# Patient Record
Sex: MALE | Race: WHITE | Employment: OTHER | URBAN - METROPOLITAN AREA
[De-identification: names, ages, dates, MRNs, and addresses within clinical notes are randomized per-mention and may not be internally consistent; named-entity substitution may affect disease eponyms.]

---

## 2017-02-01 DIAGNOSIS — I10 ESSENTIAL HYPERTENSION: Primary | ICD-10-CM

## 2017-02-03 RX ORDER — INDAPAMIDE 2.5 MG/1
TABLET, FILM COATED ORAL
Qty: 30 TAB | Refills: 5 | Status: SHIPPED | OUTPATIENT
Start: 2017-02-03 | End: 2022-06-07

## 2017-02-03 RX ORDER — METOPROLOL SUCCINATE 100 MG/1
TABLET, EXTENDED RELEASE ORAL
Qty: 30 TAB | Refills: 5 | Status: SHIPPED | OUTPATIENT
Start: 2017-02-03 | End: 2017-10-28 | Stop reason: SDUPTHER

## 2017-02-03 NOTE — TELEPHONE ENCOUNTER
Requested Prescriptions     Signed Prescriptions Disp Refills    metoprolol succinate (TOPROL-XL) 100 mg tablet 30 Tab 5     Sig: TAKE 1 TABLET BY MOUTH EVERY DAY     Authorizing Provider: Bridger Fountain     Ordering User: Karime Holley    indapamide (LOZOL) 2.5 mg tablet 30 Tab 5     Sig: TAKE 1 TABLET BY MOUTH EVERY DAY     Authorizing Provider: Bridger Fountain     Ordering User: Karime Holley     Refill approved per verbal order from Dr. Karime Gillette

## 2017-10-28 DIAGNOSIS — I10 ESSENTIAL HYPERTENSION: ICD-10-CM

## 2017-10-30 RX ORDER — METOPROLOL SUCCINATE 100 MG/1
TABLET, EXTENDED RELEASE ORAL
Qty: 30 TAB | Refills: 5 | Status: SHIPPED | OUTPATIENT
Start: 2017-10-30 | End: 2022-06-07

## 2018-03-21 ENCOUNTER — HOSPITAL ENCOUNTER (OUTPATIENT)
Dept: CT IMAGING | Age: 68
Discharge: HOME | End: 2018-03-21
Attending: INTERNAL MEDICINE
Payer: MEDICARE

## 2018-03-21 DIAGNOSIS — I71.20 ANEURYSM, AORTA, THORACIC: ICD-10-CM

## 2018-03-21 PROCEDURE — 71275 CT ANGIOGRAPHY CHEST: CPT

## 2018-03-21 PROCEDURE — 74011636320 HC RX REV CODE- 636/320: Performed by: RADIOLOGY

## 2018-03-21 RX ADMIN — IOPAMIDOL 95 ML: 755 INJECTION, SOLUTION INTRAVENOUS at 07:45

## 2019-08-27 ENCOUNTER — HOSPITAL ENCOUNTER (OUTPATIENT)
Dept: PREADMISSION TESTING | Age: 69
Discharge: HOME | End: 2019-08-27
Payer: MEDICARE

## 2019-08-27 VITALS
BODY MASS INDEX: 28.8 KG/M2 | HEIGHT: 70 IN | OXYGEN SATURATION: 97 % | DIASTOLIC BLOOD PRESSURE: 68 MMHG | SYSTOLIC BLOOD PRESSURE: 132 MMHG | HEART RATE: 72 BPM | TEMPERATURE: 98.2 F | RESPIRATION RATE: 14 BRPM | WEIGHT: 201.2 LBS

## 2019-08-27 LAB
ANION GAP SERPL CALC-SCNC: 4 MMOL/L (ref 5–15)
BUN SERPL-MCNC: 15 MG/DL (ref 6–20)
BUN/CREAT SERPL: 15 (ref 12–20)
CALCIUM SERPL-MCNC: 9.5 MG/DL (ref 8.5–10.1)
CHLORIDE SERPL-SCNC: 103 MMOL/L (ref 97–108)
CO2 SERPL-SCNC: 32 MMOL/L (ref 21–32)
CREAT SERPL-MCNC: 1.02 MG/DL (ref 0.7–1.3)
GLUCOSE SERPL-MCNC: 105 MG/DL (ref 65–100)
POTASSIUM SERPL-SCNC: 3.5 MMOL/L (ref 3.5–5.1)
SODIUM SERPL-SCNC: 139 MMOL/L (ref 136–145)

## 2019-08-27 PROCEDURE — 80048 BASIC METABOLIC PNL TOTAL CA: CPT

## 2019-08-27 PROCEDURE — 36415 COLL VENOUS BLD VENIPUNCTURE: CPT

## 2019-08-27 RX ORDER — SILDENAFIL 100 MG/1
100 TABLET, FILM COATED ORAL AS NEEDED
COMMUNITY

## 2019-08-27 NOTE — PERIOP NOTES
[address] [address] [facility]   MAIN OR                                  [phone]   MAIN PRE OP                          [phone]                                                                                AMBULATORY PRE OP          [phone]  PRE-ADMISSION TESTING    [phone]   Surgery Date:   Friday 8/30/19       Is surgery arrival time given by surgeon? NO  If NO, [facility] staff will call you between 3 and 7pm the day before your surgery with your arrival time. (If your surgery is on a Monday, we will call you the Friday before.)    Call [phone] after 7pm Monday-Friday if you did not receive your arrival time. INSTRUCTIONS BEFORE YOUR SURGERY   When You  Arrive Arrive at the [address] [facility] on the day of your surgery  Have your insurance card, photo ID, and any copayment (if needed)   Food   and   Drink NO food or drink after midnight the night before surgery    This means NO water, gum, mints, coffee, juice, etc.  No alcohol (beer, wine, liquor) 24 hours before and after surgery   Medications to   TAKE   Morning of Surgery MEDICATIONS TO TAKE THE MORNING OF SURGERY WITH A SIP OF WATER:    Metoprolol, Preservision   Medications  To  STOP      7 days before surgery  Non-Steroidal anti-inflammatory Drugs (NSAID's): for example, Ibuprofen (Advil, Motrin), Naproxen (Aleve)   Aspirin, if taking for pain    Herbal supplements, vitamins, and fish oil   Other:  (Pain medications not listed above, including Tylenol may be taken)   Blood  Thinners  If you take  Aspirin, Plavix, Coumadin, or any blood-thinning or anti-blood clot medicine, talk to the doctor who prescribed the medications for pre-operative instructions.    Bathing Clothing  Jewelry  Valuables       If you shower the morning of surgery, please do not apply anything to your skin (lotions, powders, deodorant, or makeup, especially francisca)   Follow all special bath instructions (for total joint replacement, spine and bowel surgeries)   Do not shave or trim anywhere 24 hours before surgery   Wear your hair loose or down; no pony-tails, buns, or metal hair clips   Wear loose, comfortable, clean clothes   Wear glasses instead of contacts   Leave money, valuables, and jewelry, including body piercings, at home   Going Home - or Spending the Night  SAME-DAY SURGERY: You must have a responsible adult drive you home and stay with you 24 hours after surgery   ADMITS: If your doctor is keeping you in the hospital after surgery, leave personal belongings/luggage in your car until you have a hospital room number. Hospital discharge time is 12 noon  Drivers must be here before 12 noon unless you are told differently   Special Instructions FRee  parking available from [address]     Follow all instructions so your surgery wont be cancelled. Please, be on time. If a situation occurs and you are delayed the day of surgery, call [phone]     If your physical condition changes (like a fever, cold, flu, etc.) call your surgeon. The patient was contacted  in person. Home medication reviewed and verified during PAT appointment. The patient verbalizes understanding of all instructions and does not  need reinforcement.

## 2019-08-29 ENCOUNTER — ANESTHESIA EVENT (OUTPATIENT)
Dept: SURGERY | Age: 69
End: 2019-08-29
Payer: MEDICARE

## 2019-08-30 ENCOUNTER — HOSPITAL ENCOUNTER (OUTPATIENT)
Age: 69
Setting detail: OUTPATIENT SURGERY
Discharge: HOME | End: 2019-08-30
Attending: SURGERY | Admitting: SURGERY
Payer: MEDICARE

## 2019-08-30 ENCOUNTER — ANESTHESIA (OUTPATIENT)
Dept: SURGERY | Age: 69
End: 2019-08-30
Payer: MEDICARE

## 2019-08-30 VITALS
OXYGEN SATURATION: 100 % | TEMPERATURE: 97.7 F | HEART RATE: 63 BPM | RESPIRATION RATE: 14 BRPM | SYSTOLIC BLOOD PRESSURE: 115 MMHG | DIASTOLIC BLOOD PRESSURE: 68 MMHG

## 2019-08-30 DIAGNOSIS — K42.9 UMBILICAL HERNIA WITHOUT OBSTRUCTION AND WITHOUT GANGRENE: Primary | ICD-10-CM

## 2019-08-30 PROCEDURE — 74011250636 HC RX REV CODE- 250/636: Performed by: NURSE ANESTHETIST, CERTIFIED REGISTERED

## 2019-08-30 PROCEDURE — C1781 MESH (IMPLANTABLE): HCPCS | Performed by: SURGERY

## 2019-08-30 PROCEDURE — 77030020782 HC GWN BAIR PAWS FLX 3M -B

## 2019-08-30 PROCEDURE — 76210000026 HC REC RM PH II 1 TO 1.5 HR: Performed by: SURGERY

## 2019-08-30 PROCEDURE — 76060000033 HC ANESTHESIA 1 TO 1.5 HR: Performed by: SURGERY

## 2019-08-30 PROCEDURE — 74011250636 HC RX REV CODE- 250/636: Performed by: ANESTHESIOLOGY

## 2019-08-30 PROCEDURE — 77030031139 HC SUT VCRL2 J&J -A: Performed by: SURGERY

## 2019-08-30 PROCEDURE — 77030039266 HC ADH SKN EXOFIN S2SG -A: Performed by: SURGERY

## 2019-08-30 PROCEDURE — 77030002966 HC SUT PDS J&J -A: Performed by: SURGERY

## 2019-08-30 PROCEDURE — 74011250636 HC RX REV CODE- 250/636: Performed by: SURGERY

## 2019-08-30 PROCEDURE — 74011250637 HC RX REV CODE- 250/637: Performed by: SURGERY

## 2019-08-30 PROCEDURE — 77030018836 HC SOL IRR NACL ICUM -A: Performed by: SURGERY

## 2019-08-30 PROCEDURE — 76010000149 HC OR TIME 1 TO 1.5 HR: Performed by: SURGERY

## 2019-08-30 PROCEDURE — 74011250636 HC RX REV CODE- 250/636

## 2019-08-30 PROCEDURE — 88302 TISSUE EXAM BY PATHOLOGIST: CPT

## 2019-08-30 PROCEDURE — 77030002933 HC SUT MCRYL J&J -A: Performed by: SURGERY

## 2019-08-30 PROCEDURE — 77030040361 HC SLV COMPR DVT MDII -B

## 2019-08-30 PROCEDURE — 74011000250 HC RX REV CODE- 250: Performed by: SURGERY

## 2019-08-30 PROCEDURE — 77030011640 HC PAD GRND REM COVD -A: Performed by: SURGERY

## 2019-08-30 DEVICE — VENTRALEX ST HERNIA PATCH, 6.4 CM (2.5"), CIRCLE
Type: IMPLANTABLE DEVICE | Site: ABDOMEN | Status: FUNCTIONAL
Brand: VENTRALEX

## 2019-08-30 RX ORDER — DIPHENHYDRAMINE HYDROCHLORIDE 50 MG/ML
12.5 INJECTION, SOLUTION INTRAMUSCULAR; INTRAVENOUS AS NEEDED
Status: DISCONTINUED | OUTPATIENT
Start: 2019-08-30 | End: 2019-08-30 | Stop reason: HOSPADM

## 2019-08-30 RX ORDER — MIDAZOLAM HYDROCHLORIDE 1 MG/ML
INJECTION, SOLUTION INTRAMUSCULAR; INTRAVENOUS AS NEEDED
Status: DISCONTINUED | OUTPATIENT
Start: 2019-08-30 | End: 2019-08-30 | Stop reason: HOSPADM

## 2019-08-30 RX ORDER — ALBUTEROL SULFATE 0.83 MG/ML
2.5 SOLUTION RESPIRATORY (INHALATION) AS NEEDED
Status: DISCONTINUED | OUTPATIENT
Start: 2019-08-30 | End: 2019-08-30 | Stop reason: HOSPADM

## 2019-08-30 RX ORDER — KETOROLAC TROMETHAMINE 30 MG/ML
INJECTION, SOLUTION INTRAMUSCULAR; INTRAVENOUS AS NEEDED
Status: DISCONTINUED | OUTPATIENT
Start: 2019-08-30 | End: 2019-08-30 | Stop reason: HOSPADM

## 2019-08-30 RX ORDER — HYDROMORPHONE HYDROCHLORIDE 1 MG/ML
0.5 INJECTION, SOLUTION INTRAMUSCULAR; INTRAVENOUS; SUBCUTANEOUS
Status: DISCONTINUED | OUTPATIENT
Start: 2019-08-30 | End: 2019-08-30 | Stop reason: HOSPADM

## 2019-08-30 RX ORDER — SODIUM CHLORIDE, SODIUM LACTATE, POTASSIUM CHLORIDE, CALCIUM CHLORIDE 600; 310; 30; 20 MG/100ML; MG/100ML; MG/100ML; MG/100ML
125 INJECTION, SOLUTION INTRAVENOUS CONTINUOUS
Status: DISCONTINUED | OUTPATIENT
Start: 2019-08-30 | End: 2019-08-30 | Stop reason: HOSPADM

## 2019-08-30 RX ORDER — ONDANSETRON 2 MG/ML
4 INJECTION INTRAMUSCULAR; INTRAVENOUS AS NEEDED
Status: DISCONTINUED | OUTPATIENT
Start: 2019-08-30 | End: 2019-08-30 | Stop reason: HOSPADM

## 2019-08-30 RX ORDER — PROPOFOL 10 MG/ML
INJECTION, EMULSION INTRAVENOUS
Status: DISCONTINUED | OUTPATIENT
Start: 2019-08-30 | End: 2019-08-30 | Stop reason: HOSPADM

## 2019-08-30 RX ORDER — LIDOCAINE HYDROCHLORIDE 10 MG/ML
0.1 INJECTION, SOLUTION EPIDURAL; INFILTRATION; INTRACAUDAL; PERINEURAL AS NEEDED
Status: DISCONTINUED | OUTPATIENT
Start: 2019-08-30 | End: 2019-08-30 | Stop reason: HOSPADM

## 2019-08-30 RX ORDER — SODIUM CHLORIDE, SODIUM LACTATE, POTASSIUM CHLORIDE, CALCIUM CHLORIDE 600; 310; 30; 20 MG/100ML; MG/100ML; MG/100ML; MG/100ML
150 INJECTION, SOLUTION INTRAVENOUS CONTINUOUS
Status: DISCONTINUED | OUTPATIENT
Start: 2019-08-30 | End: 2019-08-30 | Stop reason: HOSPADM

## 2019-08-30 RX ORDER — DEXAMETHASONE SODIUM PHOSPHATE 4 MG/ML
INJECTION, SOLUTION INTRA-ARTICULAR; INTRALESIONAL; INTRAMUSCULAR; INTRAVENOUS; SOFT TISSUE AS NEEDED
Status: DISCONTINUED | OUTPATIENT
Start: 2019-08-30 | End: 2019-08-30 | Stop reason: HOSPADM

## 2019-08-30 RX ORDER — PROPOFOL 10 MG/ML
INJECTION, EMULSION INTRAVENOUS AS NEEDED
Status: DISCONTINUED | OUTPATIENT
Start: 2019-08-30 | End: 2019-08-30 | Stop reason: HOSPADM

## 2019-08-30 RX ORDER — FENTANYL CITRATE 50 UG/ML
INJECTION, SOLUTION INTRAMUSCULAR; INTRAVENOUS AS NEEDED
Status: DISCONTINUED | OUTPATIENT
Start: 2019-08-30 | End: 2019-08-30 | Stop reason: HOSPADM

## 2019-08-30 RX ORDER — CEFAZOLIN SODIUM/WATER 2 G/20 ML
2 SYRINGE (ML) INTRAVENOUS ONCE
Status: COMPLETED | OUTPATIENT
Start: 2019-08-30 | End: 2019-08-30

## 2019-08-30 RX ORDER — HYDROCODONE BITARTRATE AND ACETAMINOPHEN 5; 325 MG/1; MG/1
1 TABLET ORAL ONCE
Status: COMPLETED | OUTPATIENT
Start: 2019-08-30 | End: 2019-08-30

## 2019-08-30 RX ORDER — HYDROCODONE BITARTRATE AND ACETAMINOPHEN 5; 325 MG/1; MG/1
1 TABLET ORAL
Qty: 10 TAB | Refills: 0 | Status: SHIPPED | OUTPATIENT
Start: 2019-08-30 | End: 2019-09-02

## 2019-08-30 RX ADMIN — SODIUM CHLORIDE, SODIUM LACTATE, POTASSIUM CHLORIDE, AND CALCIUM CHLORIDE: 600; 310; 30; 20 INJECTION, SOLUTION INTRAVENOUS at 09:58

## 2019-08-30 RX ADMIN — Medication 2 G: at 09:07

## 2019-08-30 RX ADMIN — SODIUM CHLORIDE, SODIUM LACTATE, POTASSIUM CHLORIDE, AND CALCIUM CHLORIDE 150 ML/HR: 600; 310; 30; 20 INJECTION, SOLUTION INTRAVENOUS at 08:29

## 2019-08-30 RX ADMIN — HYDROCODONE BITARTRATE AND ACETAMINOPHEN 1 TABLET: 5; 325 TABLET ORAL at 10:59

## 2019-08-30 RX ADMIN — FENTANYL CITRATE 50 MCG: 50 INJECTION, SOLUTION INTRAMUSCULAR; INTRAVENOUS at 08:47

## 2019-08-30 RX ADMIN — PROPOFOL 30 MG: 10 INJECTION, EMULSION INTRAVENOUS at 08:52

## 2019-08-30 RX ADMIN — KETOROLAC TROMETHAMINE 30 MG: 30 INJECTION INTRAMUSCULAR; INTRAVENOUS at 09:21

## 2019-08-30 RX ADMIN — DEXAMETHASONE SODIUM PHOSPHATE 8 MG: 4 INJECTION, SOLUTION INTRAMUSCULAR; INTRAVENOUS at 08:59

## 2019-08-30 RX ADMIN — PROPOFOL 100 MCG/KG/MIN: 10 INJECTION, EMULSION INTRAVENOUS at 08:55

## 2019-08-30 RX ADMIN — MIDAZOLAM HYDROCHLORIDE 1 MG: 1 INJECTION, SOLUTION INTRAMUSCULAR; INTRAVENOUS at 08:47

## 2019-08-30 NOTE — OP NOTES
OPERATIVE REPORT       BRIEF OPERATIVE NOTE    Date of Procedure: 2019   Preoperative Diagnosis: UMBILICAL HERNIA  Postoperative Diagnosis: UMBILICAL HERNIA    Procedure(s):  OPEN UMBILICAL HERNIA REPAIR  Surgeon(s) and Role:     * Tristan Carnes MD - Primary         Surgical Assistant:     Surgical Staff:  Circ-1: Huong Rios RN  Scrub Tech-1: Isabel Urena  Surg Asst-1: Laly Soto I  Event Time In Time Out   Incision Start 0912    Incision Close       Anesthesia: MAC   Estimated Blood Loss: minimal  Specimens:   ID Type Source Tests Collected by Time Destination   1 : umbilical hernia sac Preservative Perla Domínguez MD 2019 [id number] Pathology      Findings: umbilical fascial defect measuring 1.5 cm containing pre peritoneal fat and omentum - no adherent bowel. Complications: none   Implants:   Implant Name Type Inv. Item Serial No.  Lot No. LRB No. Used Action   MESH VENTRALEX ST MED --  - SNA  MESH VENTRALEX ST MED --  NA BARD DAVOL [id number] N/A 1 Implanted               DESCRIPTION OF PROCEDURE:     Patient was evaluated in the preop holding area and updates to history and physical was completed. The site of surgery was confirmed with the patient and the site was marked with indelible ink. Patient was then brought to the operating room and the abdomen and groins including the genitals were prepped in the standard manner. A time out was completed confirming patient, , procedure, site of surgery, special instruments and equipment needed for the procedure. Under intravenous sedation and the patient supine     anesthesia was infiltrated using lidocaine. A subumbilical incision was   made and deepened through subcutaneous tissue. The sac was clearly   identified and  from surrounding structures including the   overlying skin. The sac was opened at the level of the fascia, and the   excess sac was excised.  Adhesions on the undersurface of the peritoneum   were freed, and repair was performed using a Bard Ventralex 6.4 -cm mesh. The tags were sutured to the fascia on either side with PDS, and the fascia   was approximated in a craniocaudad direction with PDS. The umbilical dimple   was refashioned with 2-0 Vicryl, and the skin was closed with subcutaneous   3-0 Monocryl and subcuticular 4-0 Monocryl with Dermabond. ESTIMATED BLOOD LOSS: Minimal.     SPECIMENS REMOVED: Sac. COUNTS: Correct at the completion of surgery.      Fer Marquez MD   cc: Fer Marquez MD

## 2019-08-30 NOTE — BRIEF OP NOTE
BRIEF OPERATIVE NOTE    Date of Procedure: 8/30/2019   Preoperative Diagnosis: UMBILICAL HERNIA  Postoperative Diagnosis: UMBILICAL HERNIA    Procedure(s):  OPEN UMBILICAL HERNIA REPAIR  Surgeon(s) and Role:     * Latasha Francis MD - Primary         Surgical Assistant:     Surgical Staff:  Circ-1: uLcina Ann RN  Scrub Tech-1: Oscar Regan  Surg Asst-1: Cynthia Alejandra I  Event Time In Time Out   Incision Start 0912    Incision Close       Anesthesia: MAC   Estimated Blood Loss: minimal  Specimens:   ID Type Source Tests Collected by Time Destination   1 : umbilical hernia sac Preservative Donna Godinez MD 8/30/2019 2607 Pathology      Findings: umbilical fascial defect measuring 1.5 cm containing pre peritoneal fat and omentum - no adherent bowel. Complications: none   Implants:   Implant Name Type Inv.  Item Serial No.  Lot No. LRB No. Used Action   MESH VENTRALEX ST MED --  - SNA  MESH VENTRALEX ST MED --  NA BARD DAVOL [id number] N/A 1 Implanted

## 2019-08-30 NOTE — DISCHARGE INSTRUCTIONS
P O S T-OP E R AT I V E I N S T RU C T I O N S  OU T PAT I E N T S U RG E R Y HE R N I A RE PA I R    Today you underwent a hernia repair which is usually well tolerated. However, below is a list of instructions which are important for you to follow. If you have questions, please feel free to call your surgeons office. 1 EATING: Please eat lightly when you go home today for the first 24 hours. You may resume your regular diet after that.    2 EXERCISE: Limit your exercise for the first week, although stairs or other walking is fine. 3. DRESSING: Please keep the dressing dry for 48 hours after your surgery. After this time, the bandage may be removed and left undressed or covered with a lighter dressing. You may shower or bathe in 1 days. If you have steri-strips leave them intact for one week. After that you may gently remove them or allow them to fall off on their own. If you have stitches, they will be removed in the office after 1-2 weeks. 4. NO LIFTING: Until you have seen your surgeon in his office following surgery, at which time you will receive further instructions. 5. DRIVING: No driving for 1 weeks but you may ride as a passenger anytime. 6. PAIN: A prescription for pain has been given to you or your family. Please use it as prescribed and if this is inadequate, please call your surgeons office. In some cases, Tylenol or Advil may be adequate; and in that case, you will not need to fill the prescription. Pain medication may cause constipation - Colace or Milk of Magnesia may be used as needed. 7 WOUND: If you notice any increased redness, swelling, pain, or fever, please call the office. If this is noticed at a time after normal office hours, please call our answering service at [phone]. The  will then contact your surgeon or the SURGICAL ASSOCIATE on call.     8. URINATION: If unable to void (unable to pass your water) please return to the hospital emergency department. 9. DISCOLORATION: Do not be alarmed by black or bluish discoloration of your anatomy. This may extent to the scrotum or labia. This will be due to blood under the skin that will absorb itself and resolve over several days with no ill-effects. 10 APPOINTMENT: Your surgeon would like to see you in his/her in 7 days. If this appointment has not been made for you prior to your departure from Outpatient Surgery, please call your surgeons office to schedule your appointment. [facility] IN GENERAL, VASCULAR, ONCOLOGIC, BREAST AND COLON & RECTAL SURGERY  [email or web address]    DISCHARGE SUMMARY from your Nurse    The following personal items collected during your admission are returned to you:   Dental Appliance: Dental Appliances: None  Vision:    Hearing Aid:    Jewelry: Jewelry: None  Clothing: Clothing: Shirt, Undergarments, Pants, Footwear  Other Valuables:    Valuables sent to safe:      PATIENT INSTRUCTIONS:    After general anesthesia or intravenous sedation, for 24 hours or while taking prescription Narcotics:  · Limit your activities  · Do not drive and operate hazardous machinery  · Do not make important personal or business decisions  · Do  not drink alcoholic beverages  · If you have not urinated within 8 hours after discharge, please contact your surgeon on call. Report the following to your surgeon:  · Excessive pain, swelling, redness or odor of or around the surgical area  · Temperature over 100.5  · Nausea and vomiting lasting longer than 4 hours or if unable to take medications  · Any signs of decreased circulation or nerve impairment to extremity: change in color, persistent  numbness, tingling, coldness or increase pain  · Any questions    COUGH AND DEEP BREATHE    Breathing deep and coughing are very important exercises to do after surgery. Deep breathing and coughing open the little air tubes and air sacks in your lungs. You take deep breaths every day.   You may not even notice - it is just something you do when you sigh or yawn. It is a natural exercise you do to keep these air passages open. After surgery, take deep breaths and cough, on purpose. Coughing and deep breathing help prevent bronchitis and pneumonia after surgery. If you had chest or belly surgery, use a pillow as a \"hug lily\" and hold it tightly to your chest or belly when you cough. DIRECTIONS:  6. Take 10 to 15 slow deep breaths every hour while awake. 7. Breathe in deeply, and hold it for 2 seconds. 8. Exhale slowly through puckered lips, like blowing up a balloon. 9. After every 4th or 5th deep breath, hug your pillow to your chest or belly and give a hard, deep cough. Yes, it will probably hurt. But doing this exercise is very important part of healing after surgery. Take your pain medicine to help you do this exercise without too much pain. IF YOU HAVE BEEN DIAGNOSED WITH SLEEP APNEA, PLEASE USE YOUR SLEEP APNEA DEVICE OR CPAP MACHINE WHEN YOU INTEND TO NAP AFTER TAKING PAIN MEDICATION. Ankle Pumps    Ankle pumps increase the circulation of oxygenated blood to your lower extremities and decrease your risk for circulation problems such as blood clots. They also stretch the muscles, tendons and ligaments in your foot and ankle, and prevent joint contracture in the ankle and foot, especially after surgeries on the legs. It is important to do ankle pump exercises regularly after surgery because immobility increases your risk for developing a blood clot. Your doctor may also have you take an Aspirin for the next few days as well. If your doctor did not ask you to take an Aspirin, consult with him before starting Aspirin therapy on your own. Slowly point your foot forward, feeling the muscles on the top of your lower leg stretch, and hold this position for 5 seconds.                   Next, pull your foot back toward you as far as possible, stretching the calf muscles, and hold that position for 5 seconds. Repeat with the other foot. Perform 10 repetitions every hour while awake for both ankles if possible (down and then up with the foot once is one repetition). You should feel gentle stretching of the muscles in your lower leg when doing this exercise. If you feel pain, or your range of motion is limited, don't  Push too hard. Only go the limit your joint and muscles will let you go. If you have increasing pain, progressively worsening leg warmth or swelling, STOP the exercise and call your doctor. Below is information about the medications your doctor is prescribing after your visit:    Other information in your discharge envelope:  []     PRESCRIPTIONS  []     PHYSICAL THERAPY PRESCRIPTION  []     APPOINTMENT CARDS  []     Regional Anesthesia Pamphlet for block or block with On-Q Catheter from Anesthesia Service  []     Medical device information sheets/pamphlets from their    []     School/work excuse note. []     /parent work excuse note. These are general instructions for a healthy lifestyle:    *  Please give a list of your current medications to your Primary Care Provider. *  Please update this list whenever your medications are discontinued, doses are      changed, or new medications (including over-the-counter products) are added. *  Please carry medication information at all times in case of emergency situations. About Smoking  No smoking / No tobacco products / Avoid exposure to second hand smoke    Surgeon General's Warning:  Quitting smoking now greatly reduces serious risk to your health. Obesity, smoking, and sedentary lifestyle greatly increases your risk for illness and disease. A healthy diet, regular physical exercise & weight monitoring are important for maintaining a healthy lifestyle.     Congestive Heart Failure  You may be retaining fluid if you have a history of heart failure or if you experience any of the following symptoms:  Weight gain of 3 pounds or more overnight or 5 pounds in a week, increased swelling in our hands or feet or shortness of breath while lying flat in bed. Please call your doctor as soon as you notice any of these symptoms; do not wait until your next office visit. Recognize signs and symptoms of STROKE:  F - face looks uneven  A - arms unable to move or move even  S - speech slurred or non-existent  T - time-call 911 as soon as signs and symptoms begin-DO NOT go         Back to bed or wait to see if you get better-TIME IS BRAIN. Warning signs of HEART ATTACK  Call 911 if you have these symptoms    · Chest discomfort. Most heart attacks involve discomfort in the center of the chest that lasts more than a few minutes, or that goes away and comes back. It can feel like uncomfortable pressure, squeezing, fullness, or pain. · Discomfort in other areas of the upper body. Symptoms can include pain or discomfort in one or both        Arms, the back, neck, jaw, or stomach. ·  Shortness of breath with or without chest discomfort. · Other signs may include breaking out in a cold sweat, nausea, or lightheadedness    Don't wait more than five minutes to call 911 - MINUTES MATTER! Fast action can save your life. Calling 911 is almost always the fastest way to get lifesaving treatment. Emergency Medical Services staff can begin treatment when they arrive - up to an hour sooner than if someone gets to the hospital by car. LAYO [facility] MEDICATION AND SIDE EFFECT GUIDE    The [facility] MEDICATION AND SIDE EFFECT GUIDE was provided to the PATIENT AND CARE PROVIDER.   Information provided includes instruction about drug purpose and common side effects for the following medications:    · [address]

## 2019-11-26 ENCOUNTER — HOSPITAL ENCOUNTER (OUTPATIENT)
Dept: NUCLEAR MEDICINE | Age: 69
Discharge: HOME | End: 2019-11-26
Attending: UROLOGY
Payer: MEDICARE

## 2019-11-26 DIAGNOSIS — N20.0 KIDNEY STONE: ICD-10-CM

## 2019-11-26 DIAGNOSIS — N13.30 HYDRONEPHROSIS: ICD-10-CM

## 2019-11-26 PROCEDURE — 78708 K FLOW/FUNCT IMAGE W/DRUG: CPT

## 2019-11-26 PROCEDURE — 74011250636 HC RX REV CODE- 250/636: Performed by: UROLOGY

## 2019-11-26 RX ORDER — FUROSEMIDE 10 MG/ML
20 INJECTION INTRAMUSCULAR; INTRAVENOUS ONCE
Status: COMPLETED | OUTPATIENT
Start: 2019-11-26 | End: 2019-11-26

## 2019-11-26 RX ADMIN — FUROSEMIDE 20 MG: 10 INJECTION, SOLUTION INTRAMUSCULAR; INTRAVENOUS at 14:24

## 2020-10-05 ENCOUNTER — TRANSCRIBE ORDER (OUTPATIENT)
Dept: SCHEDULING | Age: 70
End: 2020-10-05

## 2020-10-05 DIAGNOSIS — R93.1 ABNORMAL ECHOCARDIOGRAM: Primary | ICD-10-CM

## 2020-10-05 DIAGNOSIS — I77.810 DILATATION OF THORACIC AORTA (HCC): ICD-10-CM

## 2020-10-13 ENCOUNTER — HOSPITAL ENCOUNTER (OUTPATIENT)
Dept: CT IMAGING | Age: 70
Discharge: HOME | End: 2020-10-13
Attending: FAMILY MEDICINE
Payer: SELF-PAY

## 2020-10-13 DIAGNOSIS — I77.810 DILATATION OF THORACIC AORTA (HCC): ICD-10-CM

## 2020-10-13 DIAGNOSIS — R93.1 ABNORMAL ECHOCARDIOGRAM: ICD-10-CM

## 2020-10-13 PROCEDURE — 75571 CT HRT W/O DYE W/CA TEST: CPT

## 2021-05-25 ENCOUNTER — TRANSCRIBE ORDER (OUTPATIENT)
Dept: SCHEDULING | Age: 71
End: 2021-05-25

## 2021-05-25 DIAGNOSIS — I71.20 ANEURYSM, AORTA, THORACIC: Primary | ICD-10-CM

## 2021-06-07 ENCOUNTER — HOSPITAL ENCOUNTER (OUTPATIENT)
Dept: LAB | Age: 71
Discharge: HOME | End: 2021-06-07

## 2021-09-13 ENCOUNTER — HOSPITAL ENCOUNTER (OUTPATIENT)
Dept: CT IMAGING | Age: 71
Discharge: HOME | End: 2021-09-13
Attending: INTERNAL MEDICINE
Payer: MEDICARE

## 2021-09-13 DIAGNOSIS — I71.20 ANEURYSM, AORTA, THORACIC: ICD-10-CM

## 2021-09-13 LAB — CREAT BLD-MCNC: 1.1 MG/DL (ref 0.6–1.3)

## 2021-09-13 PROCEDURE — 71275 CT ANGIOGRAPHY CHEST: CPT

## 2021-09-13 PROCEDURE — 74011000636 HC RX REV CODE- 636: Performed by: STUDENT IN AN ORGANIZED HEALTH CARE EDUCATION/TRAINING PROGRAM

## 2021-09-13 PROCEDURE — 82565 ASSAY OF CREATININE: CPT

## 2021-09-13 RX ADMIN — IOPAMIDOL 100 ML: 755 INJECTION, SOLUTION INTRAVENOUS at 07:40

## 2021-09-27 ENCOUNTER — TRANSCRIBE ORDER (OUTPATIENT)
Dept: SCHEDULING | Age: 71
End: 2021-09-27

## 2021-09-27 DIAGNOSIS — R18.8 FLUID FILLED ABDOMEN: Primary | ICD-10-CM

## 2021-09-29 ENCOUNTER — HOSPITAL ENCOUNTER (OUTPATIENT)
Dept: ULTRASOUND IMAGING | Age: 71
Discharge: HOME | End: 2021-09-29
Attending: INTERNAL MEDICINE
Payer: MEDICARE

## 2021-09-29 DIAGNOSIS — R18.8 FLUID FILLED ABDOMEN: ICD-10-CM

## 2021-09-29 PROCEDURE — 76700 US EXAM ABDOM COMPLETE: CPT

## 2021-12-02 ENCOUNTER — APPOINTMENT (RX ONLY)
Dept: URBAN - METROPOLITAN AREA CLINIC 141 | Facility: CLINIC | Age: 71
Setting detail: DERMATOLOGY
End: 2021-12-02

## 2021-12-02 DIAGNOSIS — D22 MELANOCYTIC NEVI: ICD-10-CM

## 2021-12-02 PROBLEM — D22.5 MELANOCYTIC NEVI OF TRUNK: Status: ACTIVE | Noted: 2021-12-02

## 2021-12-02 PROCEDURE — ? COUNSELING

## 2021-12-02 PROCEDURE — ? TREATMENT REGIMEN

## 2021-12-02 PROCEDURE — ? MONITORING

## 2021-12-02 PROCEDURE — ? DEFER

## 2021-12-02 PROCEDURE — 99203 OFFICE O/P NEW LOW 30 MIN: CPT

## 2021-12-02 PROCEDURE — ? ADDITIONAL NOTES

## 2021-12-02 ASSESSMENT — LOCATION SIMPLE DESCRIPTION DERM
LOCATION SIMPLE: RIGHT LOWER BACK
LOCATION SIMPLE: RIGHT UPPER BACK
LOCATION SIMPLE: LEFT BUTTOCK

## 2021-12-02 ASSESSMENT — LOCATION ZONE DERM: LOCATION ZONE: TRUNK

## 2021-12-02 ASSESSMENT — LOCATION DETAILED DESCRIPTION DERM
LOCATION DETAILED: RIGHT INFERIOR MEDIAL MIDBACK
LOCATION DETAILED: RIGHT INFERIOR LATERAL UPPER BACK
LOCATION DETAILED: LEFT BUTTOCK

## 2021-12-02 NOTE — PROCEDURE: DEFER
Detail Level: Detailed
Introduction Text (Please End With A Colon): The following procedure was deferred.
Procedure To Be Performed At Next Visit: Excision

## 2022-06-06 NOTE — PROGRESS NOTES
CARDIOLOGY OFFICE NOTE    Huang Haines MD, [address], [place], [address] [facility]  Phone [phone]; Fax [phone]  Mobile [phone]   Voice Mail [phone]    Primary care: Ainsley Guerra MD       ATTENTION:   This medical record was transcribed using an electronic medical records/speech recognition system. Although proofread, it may and can contain electronic, spelling and other errors. Corrections may be executed at a later time. Please feel free to contact us for any clarifications as needed. ICD-10-CM ICD-9-CM   1. Essential hypertension  I10 401.9   2. Thoracic aortic aneurysm without rupture (HCC)  I71.2 441.2   3. LVH (left ventricular hypertrophy)  I51.7 429.3   4. Statin intolerance  Z78.9 995.27   5. Agatston CAC score, <100  R93.1 793.2            Rayray Eduardo is a 66 y.o. male with  referred for thoracic aortic aneurysm          Cardiac risk factors: dyslipidemia, male gender, hypertension  I have personally obtained the history from the patient. HISTORY OF PRESENTING ILLNESS    He had seen Alejandra Farr in the past: He is doing well with no interval issues. He has a history of statin intolerance with myalgias and arthralgias. .  He states he has been doing well. He has a cardiologist in [place] as well and they recently did an echocardiogram to look at his aortic root. That was done in January and is scheduled for these test every 6 months. Occasionally I think we should move to a MRI to reduce his radiation risk. Talk to him today about his cholesterol being elevated and he has tried statins and has had myalgias but has not had any demonstration of myopathy based on elevated CPK. I suggested that he do calcium scoring and if it is elevated then possibly place him on a PCSK9 inhibitor and he is in agreement to this plan.          ACTIVE PROBLEM LIST     Patient Active Problem List    Diagnosis Date Noted    Statin intolerance 2016    Essential hypertension 2015    Agatston CAC score, <100 2015    Thoracic aortic aneurysm ([facility] [address] 2015    LVH (left ventricular hypertrophy) 2015    Aortic regurgitation 2015           PAST MEDICAL HISTORY     Past Medical History:   Diagnosis Date    Agatston CAC score, <100 2015    Aortic aneurysm (HCC)     thoracic    Aortic regurgitation     Complicated headache syndromes     Head CT scan negative    Dyslipidemia     Hearing loss     HTN (hypertension)     on medication, usually good control on medication    Kidney stones     Nasal polyps     Thoracic aortic aneurysm ([facility] [address] 2015    Urethral stricture     Left, small           PAST SURGICAL HISTORY     Past Surgical History:   Procedure Laterality Date    HX CYST REMOVAL Left Back    HX OTHER SURGICAL      L cornea repair          ALLERGIES     Allergies   Allergen Reactions    Niacin Rash    Potassium Other (comments)     Heartburn, indigestion    Statins-Hmg-Coa Reductase Inhibitors Myalgia     Muscle pain, and pt says caused \"UTI\"          FAMILY HISTORY     Family History   Problem Relation Age of Onset    Hypertension Mother     Heart Disease Father     negative for cardiac disease       SOCIAL HISTORY     Social History     Socioeconomic History    Marital status:    Tobacco Use    Smoking status: Former Smoker     Packs/day: 0.50     Years: 2.00     Pack years: 1.00     Types: Cigarettes     Quit date: 1965     Years since quittin.3    Smokeless tobacco: Never Used   Substance and Sexual Activity    Alcohol use: Yes     Alcohol/week: 7.0 standard drinks     Types: 7 Cans of beer per week     Comment: ocassionally has wine or liquor, <1X/month    Drug use: No         MEDICATIONS     Current Outpatient Medications   Medication Sig    metoprolol succinate 25 mg CSpX Take 25 Tablets by mouth daily.     spironolactone (ALDACTONE) 25 mg tablet Take 25 mg by mouth daily.  latanoprost, PF, 0.005 % drop Administer 0.005 Drops to both eyes daily.  cyanocobalamin, vitamin B-12, 5,000 mcg subl Take 5,000 mcg by mouth daily.  pyridoxine, vitamin B6, (VITAMIN B-6) 100 mg tablet Take 100 mg by mouth BID SAT&SUN.  cholecalciferol (VITAMIN D3) (5000 Units/125 mcg) tab tablet Take 125 Tablets by mouth BID Mon Wed & Fri.    sildenafil citrate (VIAGRA) 100 mg tablet Take 100 mg by mouth as needed.  vitamins  A,C,E-zinc-copper (Ocuvite PreserVision) 7,160 unit- 113 mg-100 unit tab tablet Take 2 Tablets by mouth as directed.  antiox #8/om3/dha/epa/lut/zeax (PRESERVISION AREDS 2, OMEGA-3, PO) Take 2 Caps by mouth two (2) times daily (with meals). (Patient not taking: Reported on 6/7/2022)    metoprolol succinate (TOPROL-XL) 100 mg tablet TAKE 1 TABLET BY MOUTH EVERY DAY (Patient not taking: Reported on 6/7/2022)    indapamide (LOZOL) 2.5 mg tablet TAKE 1 TABLET BY MOUTH EVERY DAY (Patient not taking: Reported on 6/7/2022)     No current facility-administered medications for this visit. I have reviewed the nurses notes, vitals, problem list, allergy list, medical history, family, social history and medications. REVIEW OF SYMPTOMS    Positives per HPI  General: Pt denies excessive weight gain or loss. Pt is able to conduct ADL's  HEENT: Denies blurred vision, headaches, hearing loss, epistaxis and difficulty swallowing. Respiratory: Denies cough, congestion, shortness of breath, RIVERA, wheezing or stridor.   Cardiovascular: Denies precordial pain, palpitations, edema or PND  Gastrointestinal: Denies poor appetite, indigestion, abdominal pain or blood in stool  Genitourinary: Denies hematuria, dysuria, increased urinary frequency  Musculoskeletal: Denies joint pain or swelling from muscles or joints  Neurologic: Denies tremor, paresthesias, headache, or sensory motor disturbance  Psychiatric: Denies confusion, insomnia, depression  Integumentray: Denies rash, itching or ulcers. Hematologic: Denies easy bruising, bleeding     PHYSICAL EXAMINATION      Vitals:    22 1053   BP: 128/78   Pulse: 88   SpO2: 98%   Weight: 185 lb (83.9 kg)   Height: 5' 10\" (1.778 m)     General: Well developed, in no acute distress. HEENT: No jaundice, oral mucosa moist, no oral ulcers  Neck: Supple, no stiffness, no lymphadenopathy, supple  Heart:  Normal S1/S2 negative S3 or S4. Regular, no murmur, gallop or rub, no jugular venous distention  Respiratory: Clear bilaterally x 4, no wheezing or rales  Abdomen:   Soft, non-tender, bowel sounds are active. Extremities:  No edema, normal cap refill, no cyanosis. Musculoskeletal: No clubbing, no deformities  Neuro: A&Ox3, speech clear, gait stable, cooperative, no focal neurologic deficits  Skin: Skin color is normal. No rashes or lesions. Non diaphoretic, moist.  Vascular: 2+ pulses symmetric in all extremities        EKG: Normal sinus rhythm with a PVC     DIAGNOSTIC DATA     1. CT heart scan  10/24/14 - 24    10/13/20-Ca scoring:   Left main: 23  Left anterior descendin  Left circumflex: 0  Right coronary: 0  Posterior descendin  OM1 23  1st Diag. 0  PLB 0  Total calcium score: 164    2. Echo   10/30/15 - mild LVH, EF 50%, LAE, moderate AR, mild MR, dilated aortic root (52mm), compared with previous echo 14 - aortic root increased from 50mm to 52mm  16 - LVEF 60%, ascending aorta 53mm, normal arch and descending aorta, moderate AR  16-LVEF 60% ascending aorta 53mm moderate AR  22- EF 55-60%, mild MR/TR, mod/severe AI, severe aortic root dilatation, bicuspid AV cannot be excluded    3. CT Chest   11/3/15 - 4.8 x 4.8 cm ascending aortic aneurysm without evidence of dissection. The descending aorta is normal in caliber  CTA of chest 16-4.8x4.8cm ascending aortic aneurysm  CTA of chest -21-1. Ascending thoracic aortic aneurysm measures 4.9 cm in diameter, grossly 4.8 cm.  Descending thoracic aorta unremarkable. 2. Large fluid collection in the mid abdomen is only partially visualized, could  reflect severely distended bladder versus other indeterminant fluid collection. Any clinical suspicion for bladder outlet obstruction? Consider dedicated  abdominal ultrasound or CT for more definitive evaluation. LABORATORY DATA          No results found for: WBC, HGBPOC, HGB, HGBP, HCTPOC, HCT, PHCT, RBCH, PLT, MCV, HGBEXT, HCTEXT, PLTEXT, HGBEXT, HCTEXT, PLTEXT   Lab Results   Component Value Date/Time    Sodium 139 08/27/2019 10:01 AM    Potassium 3.5 08/27/2019 10:01 AM    Chloride 103 08/27/2019 10:01 AM    CO2 32 08/27/2019 10:01 AM    Anion gap 4 (L) 08/27/2019 10:01 AM    Glucose 105 (H) 08/27/2019 10:01 AM    BUN 15 08/27/2019 10:01 AM    Creatinine 1.02 08/27/2019 10:01 AM    BUN/Creatinine ratio 15 08/27/2019 10:01 AM    GFR est AA >60 08/27/2019 10:01 AM    GFR est non-AA >60 08/27/2019 10:01 AM    Calcium 9.5 08/27/2019 10:01 AM                     ASSESSMENT/RECOMMENDATIONS:.      1.  Thoracic aortic aneurysm  -he did have a CT scan in 5/2021 :1. Ascending thoracic aortic aneurysm measures 4.9 cm in diameter, grossly 4.8  cm. Descending thoracic aorta unremarkable.  -We will order repeat echo and will do every 6 months and occasionally a MRI  2) moderate aortic insufficiency question bicuspid aortic valve  2. Dyslipidemia  -We will obtain calcium scoring  -If calcium is elevated will attempt again on a PCSK9 inhibitor  3. Hypertension  -Blood pressure is at goal on current medical regimen  4. We will repeat calcium scoring and if significantly elevated then would go forward with a PCSK9 inhibitor    Follow up with me in 6-8 weeks      Orders Placed This Encounter    AMB POC EKG ROUTINE W/ 12 LEADS, INTER & REP     Order Specific Question:   Reason for Exam:     Answer:   htn    metoprolol succinate 25 mg CSpX     Sig: Take 25 Tablets by mouth daily.     spironolactone (ALDACTONE) 25 mg tablet     Sig: Take 25 mg by mouth daily.  latanoprost, PF, 0.005 % drop     Sig: Administer 0.005 Drops to both eyes daily.  cyanocobalamin, vitamin B-12, 5,000 mcg subl     Sig: Take 5,000 mcg by mouth daily.  pyridoxine, vitamin B6, (VITAMIN B-6) 100 mg tablet     Sig: Take 100 mg by mouth BID SAT&SUN.  cholecalciferol (VITAMIN D3) (5000 Units/125 mcg) tab tablet     Sig: Take 125 Tablets by mouth BID Mon Wed & Fri.    vitamins  A,C,E-zinc-copper (Ocuvite PreserVision) 7,160 unit- 113 mg-100 unit tab tablet     Sig: Take 2 Tablets by mouth as directed. We discussed the expected course, resolution and complications of the diagnosis(es) in detail. Medication risks, benefits, costs, interactions, and alternatives were discussed as indicated. I advised him to contact the office if his condition worsens, changes or fails to improve as anticipated. He expressed understanding with the diagnosis(es) and plan          Follow-up and Dispositions  ·   Return in about 7 weeks (around 7/26/2022). I have discussed the diagnosis with  Rikki Cerrato and the intended plan as seen in the above orders. Questions were answered concerning future plans. I have discussed medication side effects and warnings with the patient as well. Thank you,  Buster Ken MD for involving me in the care of  Rikki Cerrato. Please do not hesitate to contact me for further questions/concerns. Huang Smith MD, [address] [place] [address] Rd[address]      [facility], [address] [place] [address] [facility]      [phone] / [phone] Fax

## 2022-06-06 NOTE — PATIENT INSTRUCTIONS

## 2022-06-07 ENCOUNTER — OFFICE VISIT (OUTPATIENT)
Dept: CARDIOLOGY CLINIC | Age: 72
End: 2022-06-07
Payer: MEDICARE

## 2022-06-07 VITALS
DIASTOLIC BLOOD PRESSURE: 78 MMHG | SYSTOLIC BLOOD PRESSURE: 128 MMHG | BODY MASS INDEX: 26.48 KG/M2 | HEIGHT: 70 IN | HEART RATE: 88 BPM | OXYGEN SATURATION: 98 % | WEIGHT: 185 LBS

## 2022-06-07 DIAGNOSIS — Z78.9 STATIN INTOLERANCE: ICD-10-CM

## 2022-06-07 DIAGNOSIS — I51.7 LVH (LEFT VENTRICULAR HYPERTROPHY): ICD-10-CM

## 2022-06-07 DIAGNOSIS — I10 ESSENTIAL HYPERTENSION: Primary | ICD-10-CM

## 2022-06-07 DIAGNOSIS — R93.1 AGATSTON CAC SCORE, <100: ICD-10-CM

## 2022-06-07 DIAGNOSIS — I71.20 THORACIC AORTIC ANEURYSM WITHOUT RUPTURE: ICD-10-CM

## 2022-06-07 PROCEDURE — 1123F ACP DISCUSS/DSCN MKR DOCD: CPT | Performed by: SPECIALIST

## 2022-06-07 PROCEDURE — 93005 ELECTROCARDIOGRAM TRACING: CPT | Performed by: SPECIALIST

## 2022-06-07 PROCEDURE — 93010 ELECTROCARDIOGRAM REPORT: CPT | Performed by: SPECIALIST

## 2022-06-07 PROCEDURE — 1101F PT FALLS ASSESS-DOCD LE1/YR: CPT | Performed by: SPECIALIST

## 2022-06-07 PROCEDURE — 99204 OFFICE O/P NEW MOD 45 MIN: CPT | Performed by: SPECIALIST

## 2022-06-07 PROCEDURE — G8417 CALC BMI ABV UP PARAM F/U: HCPCS | Performed by: SPECIALIST

## 2022-06-07 PROCEDURE — G8752 SYS BP LESS 140: HCPCS | Performed by: SPECIALIST

## 2022-06-07 PROCEDURE — G8754 DIAS BP LESS 90: HCPCS | Performed by: SPECIALIST

## 2022-06-07 PROCEDURE — G0463 HOSPITAL OUTPT CLINIC VISIT: HCPCS | Performed by: SPECIALIST

## 2022-06-07 PROCEDURE — G8510 SCR DEP NEG, NO PLAN REQD: HCPCS | Performed by: SPECIALIST

## 2022-06-07 PROCEDURE — G8427 DOCREV CUR MEDS BY ELIG CLIN: HCPCS | Performed by: SPECIALIST

## 2022-06-07 PROCEDURE — 3017F COLORECTAL CA SCREEN DOC REV: CPT | Performed by: SPECIALIST

## 2022-06-07 PROCEDURE — G8536 NO DOC ELDER MAL SCRN: HCPCS | Performed by: SPECIALIST

## 2022-06-07 RX ORDER — SPIRONOLACTONE 25 MG/1
25 TABLET ORAL DAILY
COMMUNITY
Start: 2022-04-03

## 2022-06-07 RX ORDER — CHOLECALCIFEROL TAB 125 MCG (5000 UNIT) 125 MCG
125 TAB ORAL
COMMUNITY

## 2022-06-07 RX ORDER — LATANOPROST/PF 0.005 %
0.01 DROPS OPHTHALMIC (EYE) DAILY
COMMUNITY

## 2022-06-07 RX ORDER — ACETAMINOPHEN, DIPHENHYDRAMINE HCL, PHENYLEPHRINE HCL 325; 25; 5 MG/1; MG/1; MG/1
5000 TABLET ORAL DAILY
COMMUNITY

## 2022-06-07 RX ORDER — VIT A/VIT C/VIT E/ZINC/COPPER 2148-113
2 TABLET ORAL AS DIRECTED
COMMUNITY

## 2022-06-07 RX ORDER — PYRIDOXINE HCL (VITAMIN B6) 100 MG
100 TABLET ORAL
COMMUNITY

## 2022-06-07 NOTE — PROGRESS NOTES
Chief Complaint   Patient presents with    New Patient     Self referred, LVH, aORTIC REGURGITATION     Hypertension       Vitals:    06/07/22 1053   BP: 128/78   Pulse: 88   Height: 5' 10\" (1.778 m)   Weight: 185 lb (83.9 kg)   SpO2: 98%         Chest pain: no    SOB: no    Palpitations: no    Dizziness: No more off balance     Swelling: no    Refills: no      1. Have you been to the ER, urgent care clinic since your last visit? Hospitalized since your last visit? no    2. Have you sen or consulted other health care providers outside of the [facility] system since your last visit?  (Include any pap smears or colon screening.)     Pt would like to get Echo done due to Recommendation of last

## 2022-06-07 NOTE — LETTER
6/7/2022    Patient: Zina Watkins   YOB: 1950   Date of Visit: 6/7/2022     Ronny Hernandez MD  [address] [facility]  [address] [place] [address]  Via Fax: [phone]    Dear Ronny Hernandez MD,      Thank you for referring Mr. Hazel Acharya to [facility] for evaluation. My notes for this consultation are attached. If you have questions, please do not hesitate to call me. I look forward to following your patient along with you.       Sincerely,    Austin Youngblood MD

## 2022-07-08 ENCOUNTER — ANCILLARY PROCEDURE (OUTPATIENT)
Dept: CARDIOLOGY CLINIC | Age: 72
End: 2022-07-08
Payer: MEDICARE

## 2022-07-08 VITALS
DIASTOLIC BLOOD PRESSURE: 70 MMHG | BODY MASS INDEX: 26.48 KG/M2 | SYSTOLIC BLOOD PRESSURE: 120 MMHG | HEIGHT: 70 IN | WEIGHT: 185 LBS

## 2022-07-08 DIAGNOSIS — I71.20 THORACIC AORTIC ANEURYSM WITHOUT RUPTURE: ICD-10-CM

## 2022-07-08 DIAGNOSIS — I51.7 LVH (LEFT VENTRICULAR HYPERTROPHY): ICD-10-CM

## 2022-07-08 DIAGNOSIS — R93.1 AGATSTON CAC SCORE, <100: ICD-10-CM

## 2022-07-08 DIAGNOSIS — I10 ESSENTIAL HYPERTENSION: ICD-10-CM

## 2022-07-08 DIAGNOSIS — Z78.9 STATIN INTOLERANCE: ICD-10-CM

## 2022-07-08 PROCEDURE — 93306 TTE W/DOPPLER COMPLETE: CPT | Performed by: SPECIALIST

## 2022-07-10 LAB
ECHO AO ASC DIAM: 5.1 CM
ECHO AO ASCENDING AORTA INDEX: 2.52 CM/M2
ECHO AO ROOT DIAM: 5 CM
ECHO AO ROOT INDEX: 2.48 CM/M2
ECHO AR MAX VEL PISA: 4.3 M/S
ECHO AV MEAN GRADIENT: 2 MMHG
ECHO AV MEAN VELOCITY: 0.7 M/S
ECHO AV PEAK GRADIENT: 5 MMHG
ECHO AV PEAK VELOCITY: 1.1 M/S
ECHO AV REGURGITANT PHT: 283.3 MILLISECOND
ECHO AV VELOCITY RATIO: 0.73
ECHO AV VTI: 19.4 CM
ECHO EST RA PRESSURE: 3 MMHG
ECHO LA DIAMETER INDEX: 1.83 CM/M2
ECHO LA DIAMETER: 3.7 CM
ECHO LA TO AORTIC ROOT RATIO: 0.74
ECHO LA VOL 2C: 76 ML (ref 18–58)
ECHO LA VOL 4C: 47 ML (ref 18–58)
ECHO LA VOL BP: 67 ML (ref 18–58)
ECHO LA VOL/BSA BIPLANE: 33 ML/M2 (ref 16–34)
ECHO LA VOLUME AREA LENGTH: 71 ML
ECHO LA VOLUME INDEX A2C: 38 ML/M2 (ref 16–34)
ECHO LA VOLUME INDEX A4C: 23 ML/M2 (ref 16–34)
ECHO LA VOLUME INDEX AREA LENGTH: 35 ML/M2 (ref 16–34)
ECHO LV E' LATERAL VELOCITY: 10 CM/S
ECHO LV EDV A2C: 135 ML
ECHO LV EDV A4C: 154 ML
ECHO LV EDV BP: 144 ML (ref 67–155)
ECHO LV EDV INDEX A4C: 76 ML/M2
ECHO LV EDV INDEX BP: 71 ML/M2
ECHO LV EDV NDEX A2C: 67 ML/M2
ECHO LV EJECTION FRACTION A2C: 59 %
ECHO LV EJECTION FRACTION A4C: 54 %
ECHO LV EJECTION FRACTION BIPLANE: 56 % (ref 55–100)
ECHO LV ESV A2C: 55 ML
ECHO LV ESV A4C: 71 ML
ECHO LV ESV BP: 63 ML (ref 22–58)
ECHO LV ESV INDEX A2C: 27 ML/M2
ECHO LV ESV INDEX A4C: 35 ML/M2
ECHO LV ESV INDEX BP: 31 ML/M2
ECHO LV FRACTIONAL SHORTENING: 30 % (ref 28–44)
ECHO LV INTERNAL DIMENSION DIASTOLE INDEX: 2.18 CM/M2
ECHO LV INTERNAL DIMENSION DIASTOLIC: 4.4 CM (ref 4.2–5.9)
ECHO LV INTERNAL DIMENSION SYSTOLIC INDEX: 1.53 CM/M2
ECHO LV INTERNAL DIMENSION SYSTOLIC: 3.1 CM
ECHO LV IVSD: 1.2 CM (ref 0.6–1)
ECHO LV MASS 2D: 203 G (ref 88–224)
ECHO LV MASS INDEX 2D: 100.5 G/M2 (ref 49–115)
ECHO LV POSTERIOR WALL DIASTOLIC: 1.3 CM (ref 0.6–1)
ECHO LV RELATIVE WALL THICKNESS RATIO: 0.59
ECHO LVOT AV VTI INDEX: 0.94
ECHO LVOT MEAN GRADIENT: 1 MMHG
ECHO LVOT PEAK GRADIENT: 3 MMHG
ECHO LVOT PEAK VELOCITY: 0.8 M/S
ECHO LVOT VTI: 18.2 CM
ECHO MV A VELOCITY: 0.65 M/S
ECHO MV AREA PHT: 2.9 CM2
ECHO MV E DECELERATION TIME (DT): 260.3 MS
ECHO MV E VELOCITY: 0.33 M/S
ECHO MV E/A RATIO: 0.51
ECHO MV E/E' LATERAL: 3.3
ECHO MV PRESSURE HALF TIME (PHT): 75.5 MS
ECHO RIGHT VENTRICULAR SYSTOLIC PRESSURE (RVSP): 22 MMHG
ECHO RV FREE WALL PEAK S': 10 CM/S
ECHO RV INTERNAL DIMENSION: 3.1 CM
ECHO RV TAPSE: 1.6 CM (ref 1.7–?)
ECHO TV REGURGITANT MAX VELOCITY: 2.16 M/S
ECHO TV REGURGITANT PEAK GRADIENT: 19 MMHG

## 2022-07-10 PROCEDURE — 93306 TTE W/DOPPLER COMPLETE: CPT | Performed by: SPECIALIST

## 2022-07-10 NOTE — PROGRESS NOTES
Your echocardiogram reveals low normal heart function and the aortic insufficiency or leaky valve remains unchanged from 2016. Is described as being moderately leaky. The aortic root has increased in size slightly from 4.8 to currently 5.0 and 5.1 cm. I favor getting an MRI of your heart to look at your aortic root again in 6 months. If you have any questions please do not hesitate to call me.     Take care all the best    Dene Reinaldo

## 2022-07-12 NOTE — PROGRESS NOTES
Your heart function is low normal and your leaky aortic valve remains the same as 2016. Your aortic root has increased only mildly in size from 4.8 cm to 5 cm in the 6-year. This is good news. I would like for you to have a cardiac MRI in 6 months and I will have Adrian Going schedule this for you. The cardiac MRI is to look at your aortic root with minimal radiation.     All the best    Joby Amaya

## 2022-07-13 DIAGNOSIS — I77.810 DILATED AORTIC ROOT (HCC): Primary | ICD-10-CM

## 2022-07-21 ENCOUNTER — TRANSCRIBE ORDER (OUTPATIENT)
Dept: SCHEDULING | Age: 72
End: 2022-07-21

## 2022-07-21 DIAGNOSIS — R59.0 CERVICAL LYMPHADENOPATHY: Primary | ICD-10-CM

## 2022-07-25 ENCOUNTER — APPOINTMENT (OUTPATIENT)
Dept: CT IMAGING | Age: 72
End: 2022-07-25
Attending: STUDENT IN AN ORGANIZED HEALTH CARE EDUCATION/TRAINING PROGRAM
Payer: MEDICARE

## 2022-07-25 ENCOUNTER — HOSPITAL ENCOUNTER (EMERGENCY)
Age: 72
Discharge: HOME | End: 2022-07-25
Attending: STUDENT IN AN ORGANIZED HEALTH CARE EDUCATION/TRAINING PROGRAM
Payer: MEDICARE

## 2022-07-25 VITALS
OXYGEN SATURATION: 97 % | TEMPERATURE: 98.5 F | DIASTOLIC BLOOD PRESSURE: 82 MMHG | RESPIRATION RATE: 16 BRPM | HEART RATE: 110 BPM | HEIGHT: 70 IN | SYSTOLIC BLOOD PRESSURE: 143 MMHG | WEIGHT: 150 LBS | BODY MASS INDEX: 21.47 KG/M2

## 2022-07-25 DIAGNOSIS — N10 ACUTE PYELONEPHRITIS: Primary | ICD-10-CM

## 2022-07-25 DIAGNOSIS — N28.9 KIDNEY LESION, NATIVE, LEFT: ICD-10-CM

## 2022-07-25 LAB
ALBUMIN SERPL-MCNC: 2.3 G/DL (ref 3.5–5)
ALBUMIN/GLOB SERPL: 0.5 {RATIO} (ref 1.1–2.2)
ALP SERPL-CCNC: 161 U/L (ref 45–117)
ALT SERPL-CCNC: 80 U/L (ref 12–78)
AMORPH CRY URNS QL MICRO: ABNORMAL
ANION GAP SERPL CALC-SCNC: 6 MMOL/L (ref 5–15)
APPEARANCE UR: CLEAR
AST SERPL-CCNC: 64 U/L (ref 15–37)
BACTERIA URNS QL MICRO: NEGATIVE /HPF
BASOPHILS # BLD: 0 K/UL (ref 0–0.1)
BASOPHILS NFR BLD: 0 % (ref 0–1)
BILIRUB SERPL-MCNC: 0.8 MG/DL (ref 0.2–1)
BILIRUB UR QL: NEGATIVE
BUN SERPL-MCNC: 25 MG/DL (ref 6–20)
BUN/CREAT SERPL: 16 (ref 12–20)
CALCIUM SERPL-MCNC: 9 MG/DL (ref 8.5–10.1)
CHLORIDE SERPL-SCNC: 106 MMOL/L (ref 97–108)
CO2 SERPL-SCNC: 25 MMOL/L (ref 21–32)
COLOR UR: ABNORMAL
COMMENT, HOLDF: NORMAL
CREAT SERPL-MCNC: 1.61 MG/DL (ref 0.7–1.3)
DIFFERENTIAL METHOD BLD: ABNORMAL
EOSINOPHIL # BLD: 0 K/UL (ref 0–0.4)
EOSINOPHIL NFR BLD: 0 % (ref 0–7)
EPITH CASTS URNS QL MICRO: ABNORMAL /LPF
ERYTHROCYTE [DISTWIDTH] IN BLOOD BY AUTOMATED COUNT: 14.6 % (ref 11.5–14.5)
GLOBULIN SER CALC-MCNC: 4.9 G/DL (ref 2–4)
GLUCOSE SERPL-MCNC: 115 MG/DL (ref 65–100)
GLUCOSE UR STRIP.AUTO-MCNC: NEGATIVE MG/DL
GRAN CASTS URNS QL MICRO: ABNORMAL /LPF
HCT VFR BLD AUTO: 36.7 % (ref 36.6–50.3)
HGB BLD-MCNC: 12.4 G/DL (ref 12.1–17)
HGB UR QL STRIP: ABNORMAL
IMM GRANULOCYTES # BLD AUTO: 0.1 K/UL (ref 0–0.04)
IMM GRANULOCYTES NFR BLD AUTO: 1 % (ref 0–0.5)
KETONES UR QL STRIP.AUTO: ABNORMAL MG/DL
LACTATE BLD-SCNC: 0.79 MMOL/L (ref 0.4–2)
LEUKOCYTE ESTERASE UR QL STRIP.AUTO: NEGATIVE
LIPASE SERPL-CCNC: 305 U/L (ref 73–393)
LYMPHOCYTES # BLD: 0.9 K/UL (ref 0.8–3.5)
LYMPHOCYTES NFR BLD: 6 % (ref 12–49)
MCH RBC QN AUTO: 30.4 PG (ref 26–34)
MCHC RBC AUTO-ENTMCNC: 33.8 G/DL (ref 30–36.5)
MCV RBC AUTO: 90 FL (ref 80–99)
MONOCYTES # BLD: 0.9 K/UL (ref 0–1)
MONOCYTES NFR BLD: 6 % (ref 5–13)
NEUTS SEG # BLD: 12.4 K/UL (ref 1.8–8)
NEUTS SEG NFR BLD: 87 % (ref 32–75)
NITRITE UR QL STRIP.AUTO: NEGATIVE
NRBC # BLD: 0 K/UL (ref 0–0.01)
NRBC BLD-RTO: 0 PER 100 WBC
PH UR STRIP: 5 [PH] (ref 5–8)
PLATELET # BLD AUTO: 319 K/UL (ref 150–400)
PMV BLD AUTO: 10 FL (ref 8.9–12.9)
POTASSIUM SERPL-SCNC: 4.5 MMOL/L (ref 3.5–5.1)
PROT SERPL-MCNC: 7.2 G/DL (ref 6.4–8.2)
PROT UR STRIP-MCNC: ABNORMAL MG/DL
RBC # BLD AUTO: 4.08 M/UL (ref 4.1–5.7)
RBC #/AREA URNS HPF: ABNORMAL /HPF (ref 0–5)
RBC MORPH BLD: ABNORMAL
SAMPLES BEING HELD,HOLD: NORMAL
SODIUM SERPL-SCNC: 137 MMOL/L (ref 136–145)
SP GR UR REFRACTOMETRY: 1.01 (ref 1–1.03)
UR CULT HOLD, URHOLD: NORMAL
UROBILINOGEN UR QL STRIP.AUTO: 1 EU/DL (ref 0.2–1)
WBC # BLD AUTO: 14.3 K/UL (ref 4.1–11.1)
WBC URNS QL MICRO: ABNORMAL /HPF (ref 0–4)

## 2022-07-25 PROCEDURE — 80053 COMPREHEN METABOLIC PANEL: CPT

## 2022-07-25 PROCEDURE — 87040 BLOOD CULTURE FOR BACTERIA: CPT

## 2022-07-25 PROCEDURE — 99285 EMERGENCY DEPT VISIT HI MDM: CPT

## 2022-07-25 PROCEDURE — 74011000636 HC RX REV CODE- 636: Performed by: STUDENT IN AN ORGANIZED HEALTH CARE EDUCATION/TRAINING PROGRAM

## 2022-07-25 PROCEDURE — 85025 COMPLETE CBC W/AUTO DIFF WBC: CPT

## 2022-07-25 PROCEDURE — 74177 CT ABD & PELVIS W/CONTRAST: CPT

## 2022-07-25 PROCEDURE — 83605 ASSAY OF LACTIC ACID: CPT

## 2022-07-25 PROCEDURE — 83690 ASSAY OF LIPASE: CPT

## 2022-07-25 PROCEDURE — 81001 URINALYSIS AUTO W/SCOPE: CPT

## 2022-07-25 PROCEDURE — 74011250636 HC RX REV CODE- 250/636: Performed by: STUDENT IN AN ORGANIZED HEALTH CARE EDUCATION/TRAINING PROGRAM

## 2022-07-25 PROCEDURE — 36415 COLL VENOUS BLD VENIPUNCTURE: CPT

## 2022-07-25 RX ORDER — CEFDINIR 300 MG/1
300 CAPSULE ORAL 2 TIMES DAILY
Qty: 20 CAPSULE | Refills: 0 | Status: SHIPPED | OUTPATIENT
Start: 2022-07-25 | End: 2022-08-04

## 2022-07-25 RX ORDER — ONDANSETRON 2 MG/ML
4 INJECTION INTRAMUSCULAR; INTRAVENOUS ONCE
Status: DISCONTINUED | OUTPATIENT
Start: 2022-07-25 | End: 2022-07-25 | Stop reason: HOSPADM

## 2022-07-25 RX ADMIN — IOPAMIDOL 100 ML: 755 INJECTION, SOLUTION INTRAVENOUS at 18:37

## 2022-07-25 RX ADMIN — SODIUM CHLORIDE 1000 ML: 9 INJECTION, SOLUTION INTRAVENOUS at 18:11

## 2022-07-25 NOTE — ED TRIAGE NOTES
Pt reports left lower abd pain with fever intermittently since 7/15/22    Pt report he self caths, was seen at primary 7/19/22 and dx with UTI placed on cipro, reports culture came back negative. Denies chest pain and SOB.      Pt reports motrin taken 1 hour prior to arrival.

## 2022-07-25 NOTE — ED NOTES
Patient states he self caths but cathed right before coming in. Reports he will need to wait until after fluids to produce urine specimen.

## 2022-07-26 ENCOUNTER — TRANSCRIBE ORDER (OUTPATIENT)
Dept: SCHEDULING | Age: 72
End: 2022-07-26

## 2022-07-26 DIAGNOSIS — N40.1 ENLARGED PROSTATE WITH URINARY OBSTRUCTION: Primary | ICD-10-CM

## 2022-07-26 DIAGNOSIS — R33.9 RETENTION OF URINE, UNSPECIFIED: ICD-10-CM

## 2022-07-26 DIAGNOSIS — N13.8 ENLARGED PROSTATE WITH URINARY OBSTRUCTION: Primary | ICD-10-CM

## 2022-07-26 DIAGNOSIS — N13.30 HYDRONEPHROSIS: ICD-10-CM

## 2022-07-26 DIAGNOSIS — N20.0 URIC ACID NEPHROLITHIASIS: ICD-10-CM

## 2022-07-26 NOTE — ED PROVIDER NOTES
49-year-old male with history of HTN, kidney stones, thoracic aorta aneurysm presents to the ED with chief complaint of left lower quadrant pain for approximately 1 week. Patient is diagnosed with UTI at onset of symptoms and is currently on ciprofloxacin but has had no improvement. No associated nausea, vomiting, chest pain, difficulty breathing, urinary symptoms, bowel symptoms. No history of diverticulitis in the past.  No improvement at home with over-the-counter treatment. The history is provided by the patient. Abdominal Pain   Pertinent negatives include no fever, no diarrhea, no nausea, no vomiting, no constipation, no dysuria, no hematuria, no headaches, no chest pain and no back pain. Past Medical History:   Diagnosis Date    Agatston CAC score, <100 2015    Aortic aneurysm [facility])     thoracic    Aortic regurgitation 9224    Complicated headache syndromes     Head CT scan negative    Dyslipidemia     Hearing loss     HTN (hypertension)     on medication, usually good control on medication    Kidney stones     Nasal polyps     Thoracic aortic aneurysm ([address] 2015    Urethral stricture     Left, small       Past Surgical History:   Procedure Laterality Date    HX CYST REMOVAL Left Back    HX OTHER SURGICAL      L cornea repair         Family History:   Problem Relation Age of Onset    Hypertension Mother     Heart Disease Father        Social History     Socioeconomic History    Marital status:      Spouse name: Not on file    Number of children: Not on file    Years of education: Not on file    Highest education level: Not on file   Occupational History    Not on file   Tobacco Use    Smoking status: Former     Packs/day: 0.50     Years: 2.00     Pack years: 1.00     Types: Cigarettes     Quit date: 1965     Years since quittin.5    Smokeless tobacco: Never   Substance and Sexual Activity    Alcohol use:  Yes     Alcohol/week: 7.0 standard drinks     Types: 7 Cans of beer per week     Comment: ocassionally has wine or liquor, <1X/month    Drug use: No    Sexual activity: Not on file   Other Topics Concern    Not on file   Social History Narrative    Not on file     Social Determinants of Health     Financial Resource Strain: Not on file   Food Insecurity: Not on file   Transportation Needs: Not on file   Physical Activity: Not on file   Stress: Not on file   Social Connections: Not on file   Intimate Partner Violence: Not on file   Housing Stability: Not on file         ALLERGIES: Niacin, Potassium, and Statins-hmg-coa reductase inhibitors    Review of Systems   Constitutional:  Negative for chills and fever. HENT:  Negative for congestion and rhinorrhea. Respiratory:  Negative for cough, shortness of breath and wheezing. Cardiovascular:  Negative for chest pain and leg swelling. Gastrointestinal:  Positive for abdominal pain. Negative for constipation, diarrhea, nausea and vomiting. Genitourinary:  Negative for difficulty urinating, dysuria and hematuria. Musculoskeletal:  Negative for back pain and neck pain. Skin:  Negative for color change and rash. Neurological:  Negative for weakness, numbness and headaches. Psychiatric/Behavioral:  Negative for behavioral problems and confusion. Vitals:    07/25/22 1615 07/25/22 1940   BP: (!) 143/82    Pulse: (!) 110    Resp: 16    Temp: 98.5 °F (36.9 °C)    SpO2: 97% 97%   Weight: 68 kg (150 lb)    Height: 5' 10\" (1.778 m)             Physical Exam  Constitutional:       General: He is not in acute distress. Appearance: He is well-developed. HENT:      Head: Normocephalic and atraumatic. Eyes:      Conjunctiva/sclera: Conjunctivae normal.      Pupils: Pupils are equal, round, and reactive to light. Neck:      Trachea: No tracheal deviation. Cardiovascular:      Rate and Rhythm: Normal rate and regular rhythm. Heart sounds: No murmur heard. No friction rub. No gallop.    Pulmonary: Effort: No respiratory distress. Breath sounds: Normal breath sounds. Abdominal:      General: Bowel sounds are normal. There is no distension. Palpations: Abdomen is soft. Tenderness: There is abdominal tenderness in the left lower quadrant. There is no guarding or rebound. Musculoskeletal:         General: No deformity. Cervical back: Neck supple. Skin:     General: Skin is warm and dry. Neurological:      Mental Status: He is alert and oriented to person, place, and time. MDM  Number of Diagnoses or Management Options  Acute pyelonephritis  Kidney lesion, native, left  Diagnosis management comments: 66-year-old male presenting with left lower quadrant pain. Differential includes diverticulitis, UTI, colitis. CT scan with evidence of lesions on the left kidney, concerning for neoplasm. Urinalysis not very convincing for UTI but given inflammatory changes around the kidney will treat for pyelonephritis. Patient informed of left kidney findings and will follow up with urology tomorrow, he has an appointment. We will also let his primary care doctor know. Amount and/or Complexity of Data Reviewed  Clinical lab tests: reviewed and ordered  Tests in the radiology section of CPT®: ordered and reviewed           Procedures    DISCHARGE NOTE:  9:01 PM  The patient has been re-evaluated and feeling much better and are stable for discharge. All available radiology and laboratory results have been reviewed with patient and/or available family. Patient and/or family verbally conveyed their understanding and agreement of the patient's signs, symptoms, diagnosis, treatment and prognosis and additionally agree to follow-up as recommended in the discharge instructions or to return to the Emergency Department should their condition change or worsen prior to their follow-up appointment. All questions have been answered and patient and/or available family express understanding. LABORATORY RESULTS:  Recent Results (from the past 24 hour(s))   SAMPLES BEING HELD    Collection Time: 07/25/22  4:17 PM   Result Value Ref Range    SAMPLES BEING HELD 1BLUE,1SST,RED,1PST,1DK GRN,1LAV     COMMENT        Add-on orders for these samples will be processed based on acceptable specimen integrity and analyte stability, which may vary by analyte. CBC WITH AUTOMATED DIFF    Collection Time: 07/25/22  4:17 PM   Result Value Ref Range    WBC 14.3 (H) 4.1 - 11.1 K/uL    RBC 4.08 (L) 4.10 - 5.70 M/uL    HGB 12.4 12.1 - 17.0 g/dL    HCT 36.7 36.6 - 50.3 %    MCV 90.0 80.0 - 99.0 FL    MCH 30.4 26.0 - 34.0 PG    MCHC 33.8 30.0 - 36.5 g/dL    RDW 14.6 (H) 11.5 - 14.5 %    PLATELET 025 185 - 229 K/uL    MPV 10.0 8.9 - 12.9 FL    NRBC 0.0 0  WBC    ABSOLUTE NRBC 0.00 0.00 - 0.01 K/uL    NEUTROPHILS 87 (H) 32 - 75 %    LYMPHOCYTES 6 (L) 12 - 49 %    MONOCYTES 6 5 - 13 %    EOSINOPHILS 0 0 - 7 %    BASOPHILS 0 0 - 1 %    IMMATURE GRANULOCYTES 1 (H) 0.0 - 0.5 %    ABS. NEUTROPHILS 12.4 (H) 1.8 - 8.0 K/UL    ABS. LYMPHOCYTES 0.9 0.8 - 3.5 K/UL    ABS. MONOCYTES 0.9 0.0 - 1.0 K/UL    ABS. EOSINOPHILS 0.0 0.0 - 0.4 K/UL    ABS. BASOPHILS 0.0 0.0 - 0.1 K/UL    ABS. IMM. GRANS. 0.1 (H) 0.00 - 0.04 K/UL    DF SMEAR SCANNED      RBC COMMENTS NORMOCYTIC, NORMOCHROMIC     METABOLIC PANEL, COMPREHENSIVE    Collection Time: 07/25/22  4:17 PM   Result Value Ref Range    Sodium 137 136 - 145 mmol/L    Potassium 4.5 3.5 - 5.1 mmol/L    Chloride 106 97 - 108 mmol/L    CO2 25 21 - 32 mmol/L    Anion gap 6 5 - 15 mmol/L    Glucose 115 (H) 65 - 100 mg/dL    BUN 25 (H) 6 - 20 MG/DL    Creatinine 1.61 (H) 0.70 - 1.30 MG/DL    BUN/Creatinine ratio 16 12 - 20      GFR est AA 51 (L) >60 ml/min/1.73m2    GFR est non-AA 42 (L) >60 ml/min/1.73m2    Calcium 9.0 8.5 - 10.1 MG/DL    Bilirubin, total 0.8 0.2 - 1.0 MG/DL    ALT (SGPT) 80 (H) 12 - 78 U/L    AST (SGOT) 64 (H) 15 - 37 U/L    Alk.  phosphatase 161 (H) 45 - 117 U/L Protein, total 7.2 6.4 - 8.2 g/dL    Albumin 2.3 (L) 3.5 - 5.0 g/dL    Globulin 4.9 (H) 2.0 - 4.0 g/dL    A-G Ratio 0.5 (L) 1.1 - 2.2     LIPASE    Collection Time: 07/25/22  4:17 PM   Result Value Ref Range    Lipase 305 73 - 393 U/L   POC LACTIC ACID    Collection Time: 07/25/22  6:14 PM   Result Value Ref Range    Lactic Acid (POC) 0.79 0.40 - 2.00 mmol/L   URINALYSIS W/MICROSCOPIC    Collection Time: 07/25/22  7:37 PM   Result Value Ref Range    Color YELLOW/STRAW      Appearance CLEAR CLEAR      Specific gravity 1.010 1.003 - 1.030      pH (UA) 5.0 5.0 - 8.0      Protein TRACE (A) NEG mg/dL    Glucose Negative NEG mg/dL    Ketone TRACE (A) NEG mg/dL    Bilirubin Negative NEG      Blood TRACE (A) NEG      Urobilinogen 1.0 0.2 - 1.0 EU/dL    Nitrites Negative NEG      Leukocyte Esterase Negative NEG      WBC 5-10 0 - 4 /hpf    RBC 0-5 0 - 5 /hpf    Epithelial cells FEW FEW /lpf    Bacteria Negative NEG /hpf    Amorphous Crystals FEW (A) NEG      Granular cast 2-5 (A) NEG /lpf   URINE CULTURE HOLD SAMPLE    Collection Time: 07/25/22  7:37 PM    Specimen: Serum; Urine   Result Value Ref Range    Urine culture hold        Urine on hold in Microbiology dept for 2 days. If unpreserved urine is submitted, it cannot be used for addtional testing after 24 hours, recollection will be required. IMAGING RESULTS:  CT ABD PELV W CONT    Result Date: 7/25/2022  3 complex cystic abnormalities are noted in the left kidney concerning for neoplasm. Perinephric inflammatory changes noted along with left renal cortical thinning. Nonobstructing right renal stones. MEDICATIONS GIVEN:  Medications   ondansetron [facility] STANISLA[facility]) injection 4 mg (0 mg IntraVENous Held 7/25/22 1811)   sodium chloride 0.9 % bolus infusion 1,000 mL (0 mL IntraVENous IV Completed 7/25/22 1924)   iopamidoL (ISOVUE-370) 76 % injection 100 mL (100 mL IntraVENous Given 7/25/22 1837)       IMPRESSION:  1. Acute pyelonephritis    2.  Kidney lesion, native, left PLAN:  Follow-up Information       Follow up With Specialties Details Why Contact Info    Jo-Ann Young MD Family Medicine In 3 days  [address] [facility]  [address]  [address] [facility]  [phone]            Discharge Medication List as of 7/25/2022  8:23 PM        START taking these medications    Details   cefdinir (OMNICEF) 300 mg capsule Take 1 Capsule by mouth two (2) times a day for 10 days. , Normal, Disp-20 Capsule, R-0           CONTINUE these medications which have NOT CHANGED    Details   metoprolol succinate 25 mg CSpX Take 25 Tablets by mouth daily. , Historical Med      spironolactone (ALDACTONE) 25 mg tablet Take 25 mg by mouth daily. , Historical Med      latanoprost, PF, 0.005 % drop Administer 0.005 Drops to both eyes daily. , Historical Med      cyanocobalamin, vitamin B-12, 5,000 mcg subl Take 5,000 mcg by mouth daily. , Historical Med      pyridoxine, vitamin B6, (VITAMIN B-6) 100 mg tablet Take 100 mg by mouth BID SAT&SUN., Historical Med      cholecalciferol (VITAMIN D3) (5000 Units/125 mcg) tab tablet Take 125 Tablets by mouth BID Mon Wed & Fri., Historical Med      vitamins  A,C,E-zinc-copper (Ocuvite PreserVision) 7,160 unit- 113 mg-100 unit tab tablet Take 2 Tablets by mouth as directed., Historical Med      sildenafil citrate (VIAGRA) 100 mg tablet Take 100 mg by mouth as needed., Historical Med             Signed By: Ruth Blandon MD     July 25, 2022

## 2022-07-26 NOTE — DISCHARGE INSTRUCTIONS
Please follow-up with your urologist and primary care doctor about the following results    FINDINGS:  LOWER THORAX: Small pericardial effusion is noted. LIVER: Multiple hepatic cysts, the largest of which measures 3.5 cm. BILIARY TREE: The gallbladder is markedly distended but no stones are  identified. CBD is not dilated. SPLEEN: within normal limits. PANCREAS: No mass or ductal dilatation. ADRENALS: Unremarkable. KIDNEYS: Cortical thinning of the left kidney is noted. There are 3 complex  cystic abnormalities in the left kidney, one measuring 7.7 cm, the second  measuring 4.7 cm, and the third measuring 2.1 cm. Perinephric inflammatory  changes noted. Bilateral renal cysts are noted. Obstructing right renal stones  are noted. STOMACH: Unremarkable. SMALL BOWEL: No dilatation or wall thickening. COLON: No dilatation or wall thickening. APPENDIX: Within normal limits. PERITONEUM: No ascites or pneumoperitoneum. RETROPERITONEUM: No lymphadenopathy or aortic aneurysm. REPRODUCTIVE ORGANS: Unremarkable. URINARY BLADDER: No mass or calculus. BONES: Levoconvex scoliosis of the lumbar spine is noted with degenerative  changes. ABDOMINAL WALL: No mass or hernia. ADDITIONAL COMMENTS: N/A     IMPRESSION  3 complex cystic abnormalities are noted in the left kidney concerning for  neoplasm. Perinephric inflammatory changes noted along with left renal cortical  thinning. Nonobstructing right renal stones.

## 2022-07-28 ENCOUNTER — HOSPITAL ENCOUNTER (OUTPATIENT)
Dept: ULTRASOUND IMAGING | Age: 72
Discharge: HOME | End: 2022-07-28
Admitting: NURSE PRACTITIONER
Payer: MEDICARE

## 2022-07-28 DIAGNOSIS — R59.0 CERVICAL LYMPHADENOPATHY: ICD-10-CM

## 2022-07-28 PROCEDURE — 76536 US EXAM OF HEAD AND NECK: CPT

## 2022-07-30 LAB
BACTERIA SPEC CULT: NORMAL
SERVICE CMNT-IMP: NORMAL

## 2022-08-05 ENCOUNTER — HOSPITAL ENCOUNTER (OUTPATIENT)
Dept: MRI IMAGING | Age: 72
Discharge: HOME | End: 2022-08-05
Attending: UROLOGY
Payer: MEDICARE

## 2022-08-05 VITALS — BODY MASS INDEX: 21.52 KG/M2 | WEIGHT: 150 LBS

## 2022-08-05 DIAGNOSIS — R33.9 RETENTION OF URINE, UNSPECIFIED: ICD-10-CM

## 2022-08-05 DIAGNOSIS — N20.0 URIC ACID NEPHROLITHIASIS: ICD-10-CM

## 2022-08-05 DIAGNOSIS — N13.8 ENLARGED PROSTATE WITH URINARY OBSTRUCTION: ICD-10-CM

## 2022-08-05 DIAGNOSIS — N13.30 HYDRONEPHROSIS: ICD-10-CM

## 2022-08-05 DIAGNOSIS — N40.1 ENLARGED PROSTATE WITH URINARY OBSTRUCTION: ICD-10-CM

## 2022-08-05 PROCEDURE — 74183 MRI ABD W/O CNTR FLWD CNTR: CPT

## 2022-08-05 PROCEDURE — 74011250636 HC RX REV CODE- 250/636: Performed by: RADIOLOGY

## 2022-08-05 PROCEDURE — 77030021566

## 2022-08-05 PROCEDURE — A9576 INJ PROHANCE MULTIPACK: HCPCS | Performed by: RADIOLOGY

## 2022-08-05 RX ADMIN — GADOTERIDOL 13 ML: 279.3 INJECTION, SOLUTION INTRAVENOUS at 12:30

## 2022-08-07 NOTE — PROGRESS NOTES
CARDIOLOGY OFFICE NOTE    Huang Land MD, [address], [place]  , [address] [facility]  Phone [phone]; Fax [phone]  Mobile [phone]   Voice Mail [phone]    Primary care: Jl Luis MD       ATTENTION:   This medical record was transcribed using an electronic medical records/speech recognition system. Although proofread, it may and can contain electronic, spelling and other errors. Corrections may be executed at a later time. Please feel free to contact us for any clarifications as needed. ICD-10-CM ICD-9-CM   1. Essential hypertension  I10 401.9   2. LVH (left ventricular hypertrophy)  I51.7 429.3   3. Thoracic aortic aneurysm without rupture (HCC)  I71.2 441.2   4. Agatston CAC score, <100  R93.1 793.2            Adolfo Gray is a 66 y.o. male with  referred for thoracic aortic aneurysm          Cardiac risk factors: dyslipidemia, male gender, hypertension  I have personally obtained the history from the patient. HISTORY OF PRESENTING ILLNESS   Has been doing well with no interval issues. We have been following him for his aortic root dilatation and seems relatively stable since 2015. He is intolerant of statins secondary to myalgias and arthralgias. His last ascending aortic root dimensions in 2022 were consistent with 2015    He has lost about 30 pounds since July.          ACTIVE PROBLEM LIST     Patient Active Problem List    Diagnosis Date Noted    Statin intolerance 05/16/2016    Essential hypertension 11/02/2015    Agatston CAC score, <100 11/02/2015    Thoracic aortic aneurysm ([address] 11/02/2015    LVH (left ventricular hypertrophy) 11/02/2015    Aortic regurgitation 11/02/2015           PAST MEDICAL HISTORY     Past Medical History:   Diagnosis Date    Agatston CAC score, <100 11/2/2015    Aortic aneurysm [facility])     thoracic    Aortic regurgitation 42/9/4522    Complicated headache syndromes     Head CT scan negative Dyslipidemia     Hearing loss     HTN (hypertension)     on medication, usually good control on medication    Kidney stones     Nasal polyps     [address]oracic aortic [address] [address] 2015    Urethral stricture     Left, small           PAST SURGICAL HISTORY     Past Surgical History:   Procedure Laterality Date    HX CYST REMOVAL Left Back    HX OTHER SURGICAL      L cornea repair          ALLERGIES     Allergies   Allergen Reactions    Niacin Rash    Potassium Other (comments)     Heartburn, indigestion    Statins-Hmg-Coa Reductase Inhibitors Myalgia     Muscle pain, and pt says caused \"UTI\"          FAMILY HISTORY     Family History   Problem Relation Age of Onset    Hypertension Mother     Heart Disease Father     negative for cardiac disease       SOCIAL HISTORY     Social History     Socioeconomic History    Marital status:    Tobacco Use    Smoking status: Former     Packs/day: 0.50     Years: 2.00     Pack years: 1.00     Types: Cigarettes     Quit date: 1965     Years since quittin.5    Smokeless tobacco: Never   Substance and Sexual Activity    Alcohol use: Yes     Alcohol/week: 7.0 standard drinks     Types: 7 Cans of beer per week     Comment: ocassionally has wine or liquor, <1X/month    Drug use: No         MEDICATIONS     Current Outpatient Medications   Medication Sig    metoprolol succinate 25 mg CSpX Take 25 Tablets by mouth daily. spironolactone (ALDACTONE) 25 mg tablet Take 25 mg by mouth daily. latanoprost, PF, 0.005 % drop Administer 0.005 Drops to both eyes daily. cyanocobalamin, vitamin B-12, 5,000 mcg subl Take 5,000 mcg by mouth daily. pyridoxine, vitamin B6, (VITAMIN B-6) 100 mg tablet Take 100 mg by mouth BID SAT&SUN. cholecalciferol (VITAMIN D3) (5000 Units/125 mcg) tab tablet Take 125 Tablets by mouth BID Mon Wed & Fri.    vitamins  A,C,E-zinc-copper (Ocuvite PreserVision) 7,160 unit- 113 mg-100 unit tab tablet Take 2 Tablets by mouth as directed. sildenafil citrate (VIAGRA) 100 mg tablet Take 100 mg by mouth as needed. No current facility-administered medications for this visit. I have reviewed the nurses notes, vitals, problem list, allergy list, medical history, family, social history and medications. REVIEW OF SYMPTOMS    Positives per HPI  General: Pt denies excessive weight gain or loss. Pt is able to conduct ADL's  HEENT: Denies blurred vision, headaches, hearing loss, epistaxis and difficulty swallowing. Respiratory: Denies cough, congestion, shortness of breath, RIVERA, wheezing or stridor. Cardiovascular: Denies precordial pain, palpitations, edema or PND  Gastrointestinal: Denies poor appetite, indigestion, abdominal pain or blood in stool  Genitourinary: Denies hematuria, dysuria, increased urinary frequency  Musculoskeletal: Denies joint pain or swelling from muscles or joints  Neurologic: Denies tremor, paresthesias, headache, or sensory motor disturbance  Psychiatric: Denies confusion, insomnia, depression  Integumentray: Denies rash, itching or ulcers. Hematologic: Denies easy bruising, bleeding     PHYSICAL EXAMINATION      Vitals:    08/09/22 0958   BP: 124/80   Pulse: 78   SpO2: 99%   Weight: 158 lb 9.6 oz (71.9 kg)   Height: 5' 10\" (1.778 m)       General: Well developed, in no acute distress. HEENT: No jaundice, oral mucosa moist, no oral ulcers  Neck: Supple, no stiffness, no lymphadenopathy, supple  Heart:  Normal S1/S2 negative S3 or S4. Regular, no murmur, gallop or rub, no jugular venous distention  Respiratory: Clear bilaterally x 4, no wheezing or rales  Extremities:  No edema, normal cap refill, no cyanosis. Musculoskeletal: No clubbing, no deformities  Neuro: A&Ox3, speech clear, gait stable, cooperative, no focal neurologic deficits  Skin: Skin color is normal. No rashes or lesions.  Non diaphoretic, moist.          EKG: Normal sinus rhythm with a PVC     DIAGNOSTIC DATA     1. CT heart scan  10/24/14 - 24    10/13/20-Ca scoring:   Left main: 23  Left anterior descendin  Left circumflex: 0  Right coronary: 0  Posterior descendin  OM1 23  1st Diag. 0  PLB 0  Total calcium score: 164    2. Echo   10/30/15 - mild LVH, EF 50%, LAE, moderate AR, mild MR, dilated aortic root (52mm), compared with previous echo 14 - aortic root increased from 50mm to 52mm  16 - LVEF 60%, ascending aorta 53mm, normal arch and descending aorta, moderate AR  16-LVEF 60% ascending aorta 53mm moderate AR  22- EF 55-60%, mild MR/TR, mod/severe AI, severe aortic root dilatation, bicuspid AV cannot be excluded  22-EF 56%, Findings consistent with mild concentric hypertrophy. Visually the EF appears to be between 50-55%. Mild global hypokinesis, Tricuspid,  Mild sclerosis of AV cusp., mod AI, mild MR, LAE,Dilated aortic root., 5.0cm. Moderately dilated ascending aorta, 5.1cm    3. CT Chest   11/3/15 - 4.8 x 4.8 cm ascending aortic aneurysm without evidence of dissection. The descending aorta is normal in caliber  CTA of chest 16-4.8x4.8cm ascending aortic aneurysm  CTA of chest -21-1. Ascending thoracic aortic aneurysm measures 4.9 cm in diameter, grossly 4.8 cm. Descending thoracic aorta unremarkable. 2. Large fluid collection in the mid abdomen is only partially visualized, could  reflect severely distended bladder versus other indeterminant fluid collection. Any clinical suspicion for bladder outlet obstruction? Consider dedicated  abdominal ultrasound or CT for more definitive evaluation.          LABORATORY DATA            Lab Results   Component Value Date/Time    WBC 14.3 (H) 2022 04:17 PM    HGB 12.4 2022 04:17 PM    HCT 36.7 2022 04:17 PM    PLATELET 664  04:17 PM    MCV 90.0 2022 04:17 PM      Lab Results   Component Value Date/Time    Sodium 137 2022 04:17 PM    Potassium 4.5 2022 04:17 PM    Chloride 106 2022 04:17 PM    CO2 25 2022 04:17 PM    Anion gap 6 07/25/2022 04:17 PM    Glucose 115 (H) 07/25/2022 04:17 PM    BUN 25 (H) 07/25/2022 04:17 PM    Creatinine 1.61 (H) 07/25/2022 04:17 PM    BUN/Creatinine ratio 16 07/25/2022 04:17 PM    GFR est AA 51 (L) 07/25/2022 04:17 PM    GFR est non-AA 42 (L) 07/25/2022 04:17 PM    Calcium 9.0 07/25/2022 04:17 PM    Bilirubin, total 0.8 07/25/2022 04:17 PM    Alk. phosphatase 161 (H) 07/25/2022 04:17 PM    Protein, total 7.2 07/25/2022 04:17 PM    Albumin 2.3 (L) 07/25/2022 04:17 PM    Globulin 4.9 (H) 07/25/2022 04:17 PM    A-G Ratio 0.5 (L) 07/25/2022 04:17 PM    ALT (SGPT) 80 (H) 07/25/2022 04:17 PM                     ASSESSMENT/RECOMMENDATIONS:.      1.  Thoracic aortic aneurysm  -he did have a CT scan in 5/2021 :1. Ascending thoracic aortic aneurysm measures 4.9 cm in diameter, grossly 4.8  cm. Descending thoracic aorta unremarkable.  -His thoracic aortic aneurysm has been pretty stable since 2015.  -He has had a lot of MRIs and CT scans secondary to some kidney issues.  -We also put this off for now and he is scheduled to have a echo in [place] when he sees cardiologist there  -2015 his echo described the aortic root around 5 to 5.5 cm. But by CT it was around 4.84.9  2) moderate aortic insufficiency question bicuspid aortic valve  3. Dyslipidemia  -Calcium scoring in the past was elevated  -Last calcium score was elevated in 2020.  -I told him if his LDL is elevated there is always the option of putting him on bempedoic acid or even a PCSK9 inhibitor such as Repatha or Praluent. 3.  Hypertension  -Blood pressure is at goal on current medical regimen      Follow up with me in 6-8 weeks      No orders of the defined types were placed in this encounter. We discussed the expected course, resolution and complications of the diagnosis(es) in detail. Medication risks, benefits, costs, interactions, and alternatives were discussed as indicated.   I advised him to contact the office if his condition worsens, changes or fails to improve as anticipated. He expressed understanding with the diagnosis(es) and plan          Follow-up and Dispositions    Return in about 6 months (around 2/9/2023). I have discussed the diagnosis with  Brad Arizmendi and the intended plan as seen in the above orders. Questions were answered concerning future plans. I have discussed medication side effects and warnings with the patient as well. Thank you,  Rashmi Fernandez MD for involving me in the care of  Brad Arizmendi. Please do not hesitate to contact me for further questions/concerns. Huang Smith MD, [address] [place] [address] Rd[address]      [facility], [address] [place] [address] Drive      [phone] / [phone] Fax

## 2022-08-07 NOTE — PATIENT INSTRUCTIONS

## 2022-08-09 ENCOUNTER — OFFICE VISIT (OUTPATIENT)
Dept: CARDIOLOGY CLINIC | Age: 72
End: 2022-08-09
Payer: MEDICARE

## 2022-08-09 VITALS
HEIGHT: 70 IN | DIASTOLIC BLOOD PRESSURE: 80 MMHG | SYSTOLIC BLOOD PRESSURE: 124 MMHG | WEIGHT: 158.6 LBS | OXYGEN SATURATION: 99 % | BODY MASS INDEX: 22.71 KG/M2 | HEART RATE: 78 BPM

## 2022-08-09 DIAGNOSIS — I51.7 LVH (LEFT VENTRICULAR HYPERTROPHY): ICD-10-CM

## 2022-08-09 DIAGNOSIS — R93.1 AGATSTON CAC SCORE, <100: ICD-10-CM

## 2022-08-09 DIAGNOSIS — I71.20 THORACIC AORTIC ANEURYSM WITHOUT RUPTURE: ICD-10-CM

## 2022-08-09 DIAGNOSIS — I10 ESSENTIAL HYPERTENSION: Primary | ICD-10-CM

## 2022-08-09 PROCEDURE — 1101F PT FALLS ASSESS-DOCD LE1/YR: CPT | Performed by: SPECIALIST

## 2022-08-09 PROCEDURE — G8510 SCR DEP NEG, NO PLAN REQD: HCPCS | Performed by: SPECIALIST

## 2022-08-09 PROCEDURE — 3017F COLORECTAL CA SCREEN DOC REV: CPT | Performed by: SPECIALIST

## 2022-08-09 PROCEDURE — G8754 DIAS BP LESS 90: HCPCS | Performed by: SPECIALIST

## 2022-08-09 PROCEDURE — 1123F ACP DISCUSS/DSCN MKR DOCD: CPT | Performed by: SPECIALIST

## 2022-08-09 PROCEDURE — G8752 SYS BP LESS 140: HCPCS | Performed by: SPECIALIST

## 2022-08-09 PROCEDURE — G8536 NO DOC ELDER MAL SCRN: HCPCS | Performed by: SPECIALIST

## 2022-08-09 PROCEDURE — 99214 OFFICE O/P EST MOD 30 MIN: CPT | Performed by: SPECIALIST

## 2022-08-09 PROCEDURE — G8420 CALC BMI NORM PARAMETERS: HCPCS | Performed by: SPECIALIST

## 2022-08-09 PROCEDURE — G0463 HOSPITAL OUTPT CLINIC VISIT: HCPCS | Performed by: SPECIALIST

## 2022-08-09 PROCEDURE — G8427 DOCREV CUR MEDS BY ELIG CLIN: HCPCS | Performed by: SPECIALIST

## 2022-08-09 NOTE — LETTER
8/9/2022    Patient: Reina Bolden   YOB: 1950   Date of Visit: 8/9/2022     Flavio Contreras MD  [address] [facility]  [address] [facility]  Via Fax: [phone]    Dear Flavio Contreras MD,      Thank you for referring Mr. Bay Mccray to [facility] for evaluation. My notes for this consultation are attached. If you have questions, please do not hesitate to call me. I look forward to following your patient along with you.       Sincerely,    Maame Wilson MD

## 2022-08-09 NOTE — PROGRESS NOTES
Chief Complaint   Patient presents with    Follow-up     2month    Hypertension     CAC<100,LVH       Vitals:    08/09/22 0958   BP: 124/80   Pulse: 78   Height: 5' 10\" (1.778 m)   Weight: 158 lb 9.6 oz (71.9 kg)   SpO2: 99%         Chest pain: no    SOB: no    Palpitations: no    Dizziness: no    Swelling: no    Refills:       Have you been to the ER, urgent care clinic since your last visit? Hospitalized since your last visit? 7-25-22 abdominal pain    Have you sen or consulted other [facility] care providers outside [facility] since your last visit?  (Include any pap smears or colon screening.)

## 2022-08-12 ENCOUNTER — APPOINTMENT (OUTPATIENT)
Dept: CT IMAGING | Age: 72
End: 2022-08-12
Attending: STUDENT IN AN ORGANIZED HEALTH CARE EDUCATION/TRAINING PROGRAM
Payer: MEDICARE

## 2022-08-12 ENCOUNTER — HOSPITAL ENCOUNTER (EMERGENCY)
Age: 72
Discharge: HOME | End: 2022-08-12
Attending: STUDENT IN AN ORGANIZED HEALTH CARE EDUCATION/TRAINING PROGRAM | Admitting: STUDENT IN AN ORGANIZED HEALTH CARE EDUCATION/TRAINING PROGRAM
Payer: MEDICARE

## 2022-08-12 VITALS
HEIGHT: 70 IN | OXYGEN SATURATION: 97 % | DIASTOLIC BLOOD PRESSURE: 71 MMHG | WEIGHT: 155 LBS | HEART RATE: 91 BPM | SYSTOLIC BLOOD PRESSURE: 140 MMHG | TEMPERATURE: 98 F | BODY MASS INDEX: 22.19 KG/M2 | RESPIRATION RATE: 16 BRPM

## 2022-08-12 DIAGNOSIS — N39.0 URINARY TRACT INFECTION WITHOUT HEMATURIA, SITE UNSPECIFIED: Primary | ICD-10-CM

## 2022-08-12 LAB
ALBUMIN SERPL-MCNC: 2.9 G/DL (ref 3.5–5)
ALBUMIN/GLOB SERPL: 0.6 {RATIO} (ref 1.1–2.2)
ALP SERPL-CCNC: 102 U/L (ref 45–117)
ALT SERPL-CCNC: 39 U/L (ref 12–78)
ANION GAP SERPL CALC-SCNC: 5 MMOL/L (ref 5–15)
APPEARANCE UR: ABNORMAL
AST SERPL-CCNC: 21 U/L (ref 15–37)
BACTERIA URNS QL MICRO: ABNORMAL /HPF
BASOPHILS # BLD: 0 K/UL (ref 0–0.1)
BASOPHILS NFR BLD: 0 % (ref 0–1)
BILIRUB SERPL-MCNC: 0.4 MG/DL (ref 0.2–1)
BILIRUB UR QL: NEGATIVE
BUN SERPL-MCNC: 19 MG/DL (ref 6–20)
BUN/CREAT SERPL: 15 (ref 12–20)
CALCIUM SERPL-MCNC: 9.4 MG/DL (ref 8.5–10.1)
CHLORIDE SERPL-SCNC: 106 MMOL/L (ref 97–108)
CO2 SERPL-SCNC: 27 MMOL/L (ref 21–32)
COLOR UR: ABNORMAL
CREAT SERPL-MCNC: 1.26 MG/DL (ref 0.7–1.3)
DIFFERENTIAL METHOD BLD: ABNORMAL
EOSINOPHIL # BLD: 0.1 K/UL (ref 0–0.4)
EOSINOPHIL NFR BLD: 1 % (ref 0–7)
EPITH CASTS URNS QL MICRO: ABNORMAL /LPF
ERYTHROCYTE [DISTWIDTH] IN BLOOD BY AUTOMATED COUNT: 14.4 % (ref 11.5–14.5)
GLOBULIN SER CALC-MCNC: 5.2 G/DL (ref 2–4)
GLUCOSE SERPL-MCNC: 127 MG/DL (ref 65–100)
GLUCOSE UR STRIP.AUTO-MCNC: NEGATIVE MG/DL
HCT VFR BLD AUTO: 34.3 % (ref 36.6–50.3)
HGB BLD-MCNC: 11.1 G/DL (ref 12.1–17)
HGB UR QL STRIP: NEGATIVE
IMM GRANULOCYTES # BLD AUTO: 0 K/UL (ref 0–0.04)
IMM GRANULOCYTES NFR BLD AUTO: 0 % (ref 0–0.5)
KETONES UR QL STRIP.AUTO: ABNORMAL MG/DL
LACTATE BLD-SCNC: 1.33 MMOL/L (ref 0.4–2)
LEUKOCYTE ESTERASE UR QL STRIP.AUTO: ABNORMAL
LYMPHOCYTES # BLD: 1.1 K/UL (ref 0.8–3.5)
LYMPHOCYTES NFR BLD: 8 % (ref 12–49)
MCH RBC QN AUTO: 28.9 PG (ref 26–34)
MCHC RBC AUTO-ENTMCNC: 32.4 G/DL (ref 30–36.5)
MCV RBC AUTO: 89.3 FL (ref 80–99)
MONOCYTES # BLD: 0.7 K/UL (ref 0–1)
MONOCYTES NFR BLD: 5 % (ref 5–13)
NEUTS SEG # BLD: 11.9 K/UL (ref 1.8–8)
NEUTS SEG NFR BLD: 86 % (ref 32–75)
NITRITE UR QL STRIP.AUTO: POSITIVE
NRBC # BLD: 0 K/UL (ref 0–0.01)
NRBC BLD-RTO: 0 PER 100 WBC
PH UR STRIP: 6.5 [PH] (ref 5–8)
PLATELET # BLD AUTO: 387 K/UL (ref 150–400)
PMV BLD AUTO: 9.8 FL (ref 8.9–12.9)
POTASSIUM SERPL-SCNC: 4.2 MMOL/L (ref 3.5–5.1)
PROT SERPL-MCNC: 8.1 G/DL (ref 6.4–8.2)
PROT UR STRIP-MCNC: 30 MG/DL
RBC # BLD AUTO: 3.84 M/UL (ref 4.1–5.7)
RBC #/AREA URNS HPF: ABNORMAL /HPF (ref 0–5)
SODIUM SERPL-SCNC: 138 MMOL/L (ref 136–145)
SP GR UR REFRACTOMETRY: 1.02 (ref 1–1.03)
UA: UC IF INDICATED,UAUC: ABNORMAL
UROBILINOGEN UR QL STRIP.AUTO: 0.2 EU/DL (ref 0.2–1)
WBC # BLD AUTO: 13.9 K/UL (ref 4.1–11.1)
WBC URNS QL MICRO: ABNORMAL /HPF (ref 0–4)

## 2022-08-12 PROCEDURE — 74011000250 HC RX REV CODE- 250: Performed by: STUDENT IN AN ORGANIZED HEALTH CARE EDUCATION/TRAINING PROGRAM

## 2022-08-12 PROCEDURE — 96374 THER/PROPH/DIAG INJ IV PUSH: CPT

## 2022-08-12 PROCEDURE — 87186 SC STD MICRODIL/AGAR DIL: CPT

## 2022-08-12 PROCEDURE — 74011250636 HC RX REV CODE- 250/636: Performed by: STUDENT IN AN ORGANIZED HEALTH CARE EDUCATION/TRAINING PROGRAM

## 2022-08-12 PROCEDURE — 87077 CULTURE AEROBIC IDENTIFY: CPT

## 2022-08-12 PROCEDURE — 87040 BLOOD CULTURE FOR BACTERIA: CPT

## 2022-08-12 PROCEDURE — 85025 COMPLETE CBC W/AUTO DIFF WBC: CPT

## 2022-08-12 PROCEDURE — 99285 EMERGENCY DEPT VISIT HI MDM: CPT

## 2022-08-12 PROCEDURE — 80053 COMPREHEN METABOLIC PANEL: CPT

## 2022-08-12 PROCEDURE — 74011000636 HC RX REV CODE- 636: Performed by: STUDENT IN AN ORGANIZED HEALTH CARE EDUCATION/TRAINING PROGRAM

## 2022-08-12 PROCEDURE — 87086 URINE CULTURE/COLONY COUNT: CPT

## 2022-08-12 PROCEDURE — 81001 URINALYSIS AUTO W/SCOPE: CPT

## 2022-08-12 PROCEDURE — 74177 CT ABD & PELVIS W/CONTRAST: CPT

## 2022-08-12 PROCEDURE — 83605 ASSAY OF LACTIC ACID: CPT

## 2022-08-12 PROCEDURE — 36415 COLL VENOUS BLD VENIPUNCTURE: CPT

## 2022-08-12 RX ORDER — CEFDINIR 300 MG/1
300 CAPSULE ORAL 2 TIMES DAILY
Qty: 20 CAPSULE | Refills: 0 | Status: SHIPPED | OUTPATIENT
Start: 2022-08-12 | End: 2022-08-22

## 2022-08-12 RX ADMIN — IOPAMIDOL 100 ML: 755 INJECTION, SOLUTION INTRAVENOUS at 16:09

## 2022-08-12 RX ADMIN — SODIUM CHLORIDE 1000 ML: 9 INJECTION, SOLUTION INTRAVENOUS at 13:38

## 2022-08-12 RX ADMIN — SODIUM CHLORIDE 1 G: 9 INJECTION INTRAMUSCULAR; INTRAVENOUS; SUBCUTANEOUS at 18:29

## 2022-08-12 NOTE — ED TRIAGE NOTES
Pt arrives to the ER for complaints of UTI that was diagnosed by PCP today. PCP then referred patient to come to ER for further evaluation. Pt was recently here on 7/19 and was diagnosed with a UTI and was prescribed ciprofloxacin. Pt was then seen again on 7/25 and was diagnosed with pyelonephritis and was started on cefdinir. Pt states that he self caths at home. Denies fevers, shortness of breath or chest pain. Denies any back pain or abdominal pain. PMH of aortic aneurysm.

## 2022-08-12 NOTE — DISCHARGE INSTRUCTIONS
Start antibiotic tomorrow morning as prescribed. Please follow-up with your urologist for continued management. If you have continued fevers despite antibiotics, develop vomiting and cannot keep antibiotics down, pain or any other concerns please return to the emergency department.

## 2022-08-14 LAB
BACTERIA SPEC CULT: ABNORMAL
CC UR VC: ABNORMAL
SERVICE CMNT-IMP: ABNORMAL

## 2022-08-18 LAB
BACTERIA SPEC CULT: NORMAL
SERVICE CMNT-IMP: NORMAL

## 2023-08-09 ENCOUNTER — HOSPITAL ENCOUNTER (OUTPATIENT)
Facility: HOSPITAL | Age: 73
Discharge: HOME | End: 2023-08-12
Attending: SPECIALIST
Payer: MEDICARE

## 2023-08-09 DIAGNOSIS — I51.7 LVH (LEFT VENTRICULAR HYPERTROPHY): ICD-10-CM

## 2023-08-09 DIAGNOSIS — I35.1 NONRHEUMATIC AORTIC VALVE INSUFFICIENCY: ICD-10-CM

## 2023-08-09 DIAGNOSIS — R93.1 ABNORMAL FINDINGS ON DIAGNOSTIC IMAGING OF HEART AND CORONARY CIRCULATION: ICD-10-CM

## 2023-08-09 DIAGNOSIS — I10 ESSENTIAL HYPERTENSION: ICD-10-CM

## 2023-08-09 DIAGNOSIS — I10 ESSENTIAL (PRIMARY) HYPERTENSION: ICD-10-CM

## 2023-08-09 LAB
LV EF: 60 %
LVEF MODALITY: NORMAL

## 2023-08-09 PROCEDURE — 75557 CARDIAC MRI FOR MORPH: CPT

## 2023-08-24 ENCOUNTER — TELEPHONE (OUTPATIENT)
Age: 73
End: 2023-08-24

## 2023-08-24 DIAGNOSIS — R93.1 ABNORMAL FINDINGS ON DIAGNOSTIC IMAGING OF HEART AND CORONARY CIRCULATION: ICD-10-CM

## 2023-08-24 DIAGNOSIS — I35.1 NONRHEUMATIC AORTIC VALVE INSUFFICIENCY: ICD-10-CM

## 2023-08-24 DIAGNOSIS — I71.9 AORTIC ANEURYSM (HCC): Primary | ICD-10-CM

## 2023-08-24 NOTE — TELEPHONE ENCOUNTER
----- Message from Irina Olguin MD sent at 8/9/2023 10:07 PM EDT -----  His aortic root is slightly greater than in 2021. I would like for him to see  for evaluation of Aortic root. It appears to be slightly larger but not to the point of replacement yet. I do want surgery's opinion.     Thanks  ----- Message -----  From: Negar Cam Incoming Orders Results To Radiant  Sent: 8/9/2023   5:06 PM EDT  To: Irina Olguin MD

## 2023-10-03 ENCOUNTER — ANCILLARY PROCEDURE (OUTPATIENT)
Age: 73
End: 2023-10-03
Payer: MEDICARE

## 2023-10-03 VITALS
DIASTOLIC BLOOD PRESSURE: 74 MMHG | HEART RATE: 72 BPM | WEIGHT: 180 LBS | HEIGHT: 70 IN | SYSTOLIC BLOOD PRESSURE: 140 MMHG | BODY MASS INDEX: 25.77 KG/M2

## 2023-10-03 DIAGNOSIS — I10 ESSENTIAL (PRIMARY) HYPERTENSION: ICD-10-CM

## 2023-10-03 DIAGNOSIS — I10 ESSENTIAL HYPERTENSION: ICD-10-CM

## 2023-10-03 DIAGNOSIS — I35.1 NONRHEUMATIC AORTIC VALVE INSUFFICIENCY: ICD-10-CM

## 2023-10-03 DIAGNOSIS — R93.1 ABNORMAL FINDINGS ON DIAGNOSTIC IMAGING OF HEART AND CORONARY CIRCULATION: ICD-10-CM

## 2023-10-03 DIAGNOSIS — I51.7 LVH (LEFT VENTRICULAR HYPERTROPHY): ICD-10-CM

## 2023-10-03 LAB
ECHO AO ASC DIAM: 5.1 CM
ECHO AO ASCENDING AORTA INDEX: 2.55 CM/M2
ECHO AO ROOT DIAM: 5 CM
ECHO AO ROOT INDEX: 2.5 CM/M2
ECHO AV MEAN GRADIENT: 4 MMHG
ECHO AV MEAN VELOCITY: 1 M/S
ECHO AV PEAK GRADIENT: 9 MMHG
ECHO AV PEAK VELOCITY: 1.5 M/S
ECHO AV VELOCITY RATIO: 0.8
ECHO AV VTI: 27 CM
ECHO BSA: 2.01 M2
ECHO EST RA PRESSURE: 3 MMHG
ECHO LA DIAMETER INDEX: 1.9 CM/M2
ECHO LA DIAMETER: 3.8 CM
ECHO LA TO AORTIC ROOT RATIO: 0.76
ECHO LA VOL 2C: 62 ML (ref 18–58)
ECHO LA VOL 4C: 56 ML (ref 18–58)
ECHO LA VOLUME AREA LENGTH: 66 ML
ECHO LA VOLUME INDEX A2C: 31 ML/M2 (ref 16–34)
ECHO LA VOLUME INDEX A4C: 28 ML/M2 (ref 16–34)
ECHO LA VOLUME INDEX AREA LENGTH: 33 ML/M2 (ref 16–34)
ECHO LV E' LATERAL VELOCITY: 8 CM/S
ECHO LV E' SEPTAL VELOCITY: 4 CM/S
ECHO LV EDV A2C: 194 ML
ECHO LV EDV A4C: 183 ML
ECHO LV EDV BP: 194 ML (ref 67–155)
ECHO LV EDV INDEX A4C: 92 ML/M2
ECHO LV EDV INDEX BP: 97 ML/M2
ECHO LV EDV NDEX A2C: 97 ML/M2
ECHO LV EJECTION FRACTION A2C: 58 %
ECHO LV EJECTION FRACTION A4C: 53 %
ECHO LV EJECTION FRACTION BIPLANE: 56 % (ref 55–100)
ECHO LV ESV A2C: 82 ML
ECHO LV ESV A4C: 87 ML
ECHO LV ESV BP: 86 ML (ref 22–58)
ECHO LV ESV INDEX A2C: 41 ML/M2
ECHO LV ESV INDEX A4C: 44 ML/M2
ECHO LV ESV INDEX BP: 43 ML/M2
ECHO LV FRACTIONAL SHORTENING: 27 % (ref 28–44)
ECHO LV INTERNAL DIMENSION DIASTOLE INDEX: 2.45 CM/M2
ECHO LV INTERNAL DIMENSION DIASTOLIC: 4.9 CM (ref 4.2–5.9)
ECHO LV INTERNAL DIMENSION SYSTOLIC INDEX: 1.8 CM/M2
ECHO LV INTERNAL DIMENSION SYSTOLIC: 3.6 CM
ECHO LV IVSD: 1.1 CM (ref 0.6–1)
ECHO LV MASS 2D: 213.3 G (ref 88–224)
ECHO LV MASS INDEX 2D: 106.6 G/M2 (ref 49–115)
ECHO LV POSTERIOR WALL DIASTOLIC: 1.2 CM (ref 0.6–1)
ECHO LV RELATIVE WALL THICKNESS RATIO: 0.49
ECHO LVOT AV VTI INDEX: 0.78
ECHO LVOT MEAN GRADIENT: 3 MMHG
ECHO LVOT PEAK GRADIENT: 6 MMHG
ECHO LVOT PEAK VELOCITY: 1.2 M/S
ECHO LVOT VTI: 21.1 CM
ECHO MV A VELOCITY: 0.62 M/S
ECHO MV AREA PHT: 2.5 CM2
ECHO MV E DECELERATION TIME (DT): 302.3 MS
ECHO MV E VELOCITY: 0.32 M/S
ECHO MV E/A RATIO: 0.52
ECHO MV E/E' LATERAL: 4
ECHO MV E/E' RATIO (AVERAGED): 6
ECHO MV E/E' SEPTAL: 8
ECHO MV PRESSURE HALF TIME (PHT): 87.7 MS
ECHO RA AREA 4C: 16 CM2
ECHO RA END SYSTOLIC VOLUME APICAL 4 CHAMBER INDEX BSA: 23 ML/M2
ECHO RA VOLUME: 45 ML
ECHO RIGHT VENTRICULAR SYSTOLIC PRESSURE (RVSP): 20 MMHG
ECHO RV FREE WALL PEAK S': 9 CM/S
ECHO RV INTERNAL DIMENSION: 3.7 CM
ECHO RV TAPSE: 1.8 CM (ref 1.7–?)
ECHO TV REGURGITANT MAX VELOCITY: 2.08 M/S
ECHO TV REGURGITANT PEAK GRADIENT: 17 MMHG

## 2023-10-03 PROCEDURE — 93306 TTE W/DOPPLER COMPLETE: CPT | Performed by: SPECIALIST

## 2023-10-03 PROCEDURE — 93306 TTE W/DOPPLER COMPLETE: CPT

## 2023-10-05 NOTE — RESULT ENCOUNTER NOTE
Your echocardiogram reveals Your  heart function  is about the same and the aortic insufficiency is as well  All the best,    Perico Schultz

## 2023-10-16 NOTE — PATIENT INSTRUCTIONS

## 2023-10-17 ENCOUNTER — OFFICE VISIT (OUTPATIENT)
Age: 73
End: 2023-10-17
Payer: MEDICARE

## 2023-10-17 VITALS
DIASTOLIC BLOOD PRESSURE: 70 MMHG | HEIGHT: 70 IN | WEIGHT: 179 LBS | SYSTOLIC BLOOD PRESSURE: 130 MMHG | BODY MASS INDEX: 25.62 KG/M2

## 2023-10-17 DIAGNOSIS — I35.1 NONRHEUMATIC AORTIC VALVE INSUFFICIENCY: ICD-10-CM

## 2023-10-17 DIAGNOSIS — I10 ESSENTIAL HYPERTENSION: Primary | ICD-10-CM

## 2023-10-17 DIAGNOSIS — I51.7 LVH (LEFT VENTRICULAR HYPERTROPHY): ICD-10-CM

## 2023-10-17 DIAGNOSIS — R93.1 ABNORMAL FINDINGS ON DIAGNOSTIC IMAGING OF HEART AND CORONARY CIRCULATION: ICD-10-CM

## 2023-10-17 DIAGNOSIS — I10 ESSENTIAL (PRIMARY) HYPERTENSION: ICD-10-CM

## 2023-10-17 DIAGNOSIS — I71.9 AORTIC ANEURYSM (HCC): Primary | ICD-10-CM

## 2023-10-17 PROCEDURE — 1123F ACP DISCUSS/DSCN MKR DOCD: CPT | Performed by: SPECIALIST

## 2023-10-17 PROCEDURE — 3078F DIAST BP <80 MM HG: CPT | Performed by: SPECIALIST

## 2023-10-17 PROCEDURE — 99214 OFFICE O/P EST MOD 30 MIN: CPT | Performed by: SPECIALIST

## 2023-10-17 PROCEDURE — G8484 FLU IMMUNIZE NO ADMIN: HCPCS | Performed by: SPECIALIST

## 2023-10-17 PROCEDURE — G8419 CALC BMI OUT NRM PARAM NOF/U: HCPCS | Performed by: SPECIALIST

## 2023-10-17 PROCEDURE — 3075F SYST BP GE 130 - 139MM HG: CPT | Performed by: SPECIALIST

## 2023-10-17 PROCEDURE — G8427 DOCREV CUR MEDS BY ELIG CLIN: HCPCS | Performed by: SPECIALIST

## 2023-10-17 PROCEDURE — 3017F COLORECTAL CA SCREEN DOC REV: CPT | Performed by: SPECIALIST

## 2023-10-17 PROCEDURE — 1036F TOBACCO NON-USER: CPT | Performed by: SPECIALIST

## 2023-10-17 NOTE — PROGRESS NOTES
Eligio Fisher is a 74 y.o. male    had concerns including Hypertension and Valvular Heart Disease. Vitals:    10/17/23 0859   BP: 130/70   Site: Right Upper Arm   Weight: 179 lb (81.2 kg)   Height: 5' 10\" (1.778 m)        Chest pain NO    SOB NO    Dizziness NO    Swelling NO    Palpitations NO    Refills NO    Covid Vaccinated NO      1. Have you been to the ER, urgent care clinic since your last visit? Hospitalized since your last visit? NO    2. Have you seen or consulted any other health care providers outside of the [address] [place] [address] since your last visit? Include any pap smears or colon screening. NO    NAME Priti Dorman          MRN    [id number]      LAST OFFICE APPOINTMENT: 2023     DIAGNOSIS    ICD-10-CM    1. Essential hypertension  I10       2. LVH (left ventricular hypertrophy)  I51.7       3. Essential (primary) hypertension  I10           HOME MEDICATION  Current Outpatient Medications   Medication Sig    Multiple Vitamins-Minerals (OCUVITE PRESERVISION PO) Take 2 tablets by mouth    spironolactone (ALDACTONE) 25 MG tablet Take 1 tablet by mouth daily    vitamin D3 (CHOLECALCIFEROL) 125 MCG (5000 UT) TABS tablet Take 1 tablet by mouth every other day    latanoprost (XALATAN) 0.005 % ophthalmic solution Apply 0.005 drops to eye daily    pyridoxine (B-6) 100 MG tablet Take 1 tablet by mouth     No current facility-administered medications for this visit.        VITAL SIGNS  Wt Readings from Last 3 Encounters:   10/17/23 179 lb (81.2 kg)   10/03/23 180 lb (81.6 kg)   23 188 lb 9.6 oz (85.5 kg)     BP Readings from Last 3 Encounters:   10/17/23 130/70   10/03/23 (!) 140/74   23 138/80     Pulse Readings from Last 3 Encounters:   10/03/23 72   23 84   22 78         SPECIALTY COMMENTS  1. CT heart scan  10/24/14 - 24    10/13/20-Ca scoring:   Left main: 23  Left anterior descendin  Left circumflex: 0  Right coronary: 0  Posterior descending:
I advised him to contact the office if his condition worsens, changes or fails to improve as anticipated. He expressed understanding with the diagnosis(es) and plan        No follow-up provider specified. I have discussed the diagnosis with  Lamine Fish and the intended plan as seen in the above orders. Questions were answered concerning future plans. I have discussed medication side effects and warnings with the patient as well. Thank you,  Gretchen Boswell MD for involving me in the care of  Lamine Fish. Please do not hesitate to contact me for further questions/concerns. Jethro Schultz MD, [address]      [phone] / [phone] Fax

## 2023-10-30 ENCOUNTER — INITIAL CONSULT (OUTPATIENT)
Age: 73
End: 2023-10-30
Payer: MEDICARE

## 2023-10-30 VITALS
HEIGHT: 70 IN | RESPIRATION RATE: 16 BRPM | BODY MASS INDEX: 25.98 KG/M2 | WEIGHT: 181.5 LBS | HEART RATE: 88 BPM | DIASTOLIC BLOOD PRESSURE: 71 MMHG | OXYGEN SATURATION: 96 % | SYSTOLIC BLOOD PRESSURE: 150 MMHG

## 2023-10-30 DIAGNOSIS — I71.21 ANEURYSM OF ASCENDING AORTA WITHOUT RUPTURE (HCC): Primary | ICD-10-CM

## 2023-10-30 PROCEDURE — 3074F SYST BP LT 130 MM HG: CPT | Performed by: THORACIC SURGERY (CARDIOTHORACIC VASCULAR SURGERY)

## 2023-10-30 PROCEDURE — 3017F COLORECTAL CA SCREEN DOC REV: CPT | Performed by: THORACIC SURGERY (CARDIOTHORACIC VASCULAR SURGERY)

## 2023-10-30 PROCEDURE — 1036F TOBACCO NON-USER: CPT | Performed by: THORACIC SURGERY (CARDIOTHORACIC VASCULAR SURGERY)

## 2023-10-30 PROCEDURE — G8484 FLU IMMUNIZE NO ADMIN: HCPCS | Performed by: THORACIC SURGERY (CARDIOTHORACIC VASCULAR SURGERY)

## 2023-10-30 PROCEDURE — G8419 CALC BMI OUT NRM PARAM NOF/U: HCPCS | Performed by: THORACIC SURGERY (CARDIOTHORACIC VASCULAR SURGERY)

## 2023-10-30 PROCEDURE — G8427 DOCREV CUR MEDS BY ELIG CLIN: HCPCS | Performed by: THORACIC SURGERY (CARDIOTHORACIC VASCULAR SURGERY)

## 2023-10-30 PROCEDURE — 1123F ACP DISCUSS/DSCN MKR DOCD: CPT | Performed by: THORACIC SURGERY (CARDIOTHORACIC VASCULAR SURGERY)

## 2023-10-30 PROCEDURE — 99204 OFFICE O/P NEW MOD 45 MIN: CPT | Performed by: THORACIC SURGERY (CARDIOTHORACIC VASCULAR SURGERY)

## 2023-10-30 PROCEDURE — 3078F DIAST BP <80 MM HG: CPT | Performed by: THORACIC SURGERY (CARDIOTHORACIC VASCULAR SURGERY)

## 2023-10-30 RX ORDER — CHLORAL HYDRATE 500 MG
1000 CAPSULE ORAL EVERY OTHER DAY
COMMUNITY

## 2023-10-31 ENCOUNTER — PREP FOR PROCEDURE (OUTPATIENT)
Age: 73
End: 2023-10-31

## 2023-11-03 DIAGNOSIS — I71.21 ANEURYSM OF ASCENDING AORTA WITHOUT RUPTURE (HCC): Primary | ICD-10-CM

## 2023-11-08 ENCOUNTER — TELEPHONE (OUTPATIENT)
Age: 73
End: 2023-11-08

## 2023-11-08 NOTE — TELEPHONE ENCOUNTER
Jerry Earl, MYRA - NP Reche Najjar, SUZANNE; Josh Gruber MD  [facility], we saw Mr. Reagan Nguyễn for his AI and aneurysm on 10/30. Dr. Viviane Atkinson would like a repeat echo in June 2024 (I ordered) and follow up with Dr. Lorenzo Berumen and the [facility]. I will make him an appointment to see Dr. Viviane Atkinson in the [facility] at the end of June.    Thanks so much, [employer]

## 2024-06-03 NOTE — PATIENT INSTRUCTIONS

## 2024-06-04 ENCOUNTER — OFFICE VISIT (OUTPATIENT)
Age: 74
End: 2024-06-04
Payer: MEDICARE

## 2024-06-04 ENCOUNTER — ANCILLARY PROCEDURE (OUTPATIENT)
Age: 74
End: 2024-06-04
Payer: MEDICARE

## 2024-06-04 VITALS
SYSTOLIC BLOOD PRESSURE: 136 MMHG | DIASTOLIC BLOOD PRESSURE: 84 MMHG | HEIGHT: 70 IN | HEART RATE: 74 BPM | BODY MASS INDEX: 26.48 KG/M2 | WEIGHT: 185 LBS | OXYGEN SATURATION: 94 %

## 2024-06-04 VITALS
BODY MASS INDEX: 26.48 KG/M2 | DIASTOLIC BLOOD PRESSURE: 84 MMHG | WEIGHT: 185 LBS | HEART RATE: 72 BPM | SYSTOLIC BLOOD PRESSURE: 136 MMHG | HEIGHT: 70 IN

## 2024-06-04 DIAGNOSIS — I51.7 CARDIOMEGALY: ICD-10-CM

## 2024-06-04 DIAGNOSIS — I10 ESSENTIAL HYPERTENSION: Primary | ICD-10-CM

## 2024-06-04 DIAGNOSIS — I71.10 RUPTURED ANEURYSM OF THORACIC AORTA, UNSPECIFIED PART (HCC): ICD-10-CM

## 2024-06-04 DIAGNOSIS — I71.21 ANEURYSM OF ASCENDING AORTA WITHOUT RUPTURE (HCC): ICD-10-CM

## 2024-06-04 DIAGNOSIS — I35.1 NONRHEUMATIC AORTIC VALVE INSUFFICIENCY: ICD-10-CM

## 2024-06-04 DIAGNOSIS — I51.7 LVH (LEFT VENTRICULAR HYPERTROPHY): ICD-10-CM

## 2024-06-04 LAB
ECHO AO ASC DIAM: 5.2 CM
ECHO AO ASCENDING AORTA INDEX: 2.57 CM/M2
ECHO AO ROOT DIAM: 5 CM
ECHO AO ROOT INDEX: 2.48 CM/M2
ECHO AR MAX VEL PISA: 4.1 M/S
ECHO AV MEAN GRADIENT: 3 MMHG
ECHO AV MEAN VELOCITY: 0.8 M/S
ECHO AV PEAK GRADIENT: 5 MMHG
ECHO AV PEAK VELOCITY: 1.2 M/S
ECHO AV REGURGITANT PHT: 343 MILLISECOND
ECHO AV VELOCITY RATIO: 0.75
ECHO AV VTI: 21.3 CM
ECHO BSA: 2.04 M2
ECHO EST RA PRESSURE: 3 MMHG
ECHO LA DIAMETER INDEX: 1.93 CM/M2
ECHO LA DIAMETER: 3.9 CM
ECHO LA TO AORTIC ROOT RATIO: 0.78
ECHO LA VOL A-L A2C: 64 ML (ref 18–58)
ECHO LA VOL A-L A4C: 51 ML (ref 18–58)
ECHO LA VOL BP: 53 ML (ref 18–58)
ECHO LA VOL MOD A2C: 59 ML (ref 18–58)
ECHO LA VOL MOD A4C: 47 ML (ref 18–58)
ECHO LA VOL/BSA BIPLANE: 26 ML/M2 (ref 16–34)
ECHO LA VOLUME AREA LENGTH: 58 ML
ECHO LA VOLUME INDEX A-L A2C: 32 ML/M2 (ref 16–34)
ECHO LA VOLUME INDEX A-L A4C: 25 ML/M2 (ref 16–34)
ECHO LA VOLUME INDEX AREA LENGTH: 29 ML/M2 (ref 16–34)
ECHO LA VOLUME INDEX MOD A2C: 29 ML/M2 (ref 16–34)
ECHO LA VOLUME INDEX MOD A4C: 23 ML/M2 (ref 16–34)
ECHO LV E' LATERAL VELOCITY: 9 CM/S
ECHO LV E' SEPTAL VELOCITY: 4 CM/S
ECHO LV EDV A2C: 160 ML
ECHO LV EDV A4C: 102 ML
ECHO LV EDV BP: 135 ML (ref 67–155)
ECHO LV EDV INDEX A4C: 50 ML/M2
ECHO LV EDV INDEX BP: 67 ML/M2
ECHO LV EDV NDEX A2C: 79 ML/M2
ECHO LV EJECTION FRACTION A2C: 55 %
ECHO LV EJECTION FRACTION A4C: 58 %
ECHO LV EJECTION FRACTION BIPLANE: 56 % (ref 55–100)
ECHO LV ESV A2C: 72 ML
ECHO LV ESV A4C: 43 ML
ECHO LV ESV BP: 60 ML (ref 22–58)
ECHO LV ESV INDEX A2C: 36 ML/M2
ECHO LV ESV INDEX A4C: 21 ML/M2
ECHO LV ESV INDEX BP: 30 ML/M2
ECHO LV FRACTIONAL SHORTENING: 28 % (ref 28–44)
ECHO LV INTERNAL DIMENSION DIASTOLE INDEX: 2.33 CM/M2
ECHO LV INTERNAL DIMENSION DIASTOLIC: 4.7 CM (ref 4.2–5.9)
ECHO LV INTERNAL DIMENSION SYSTOLIC INDEX: 1.68 CM/M2
ECHO LV INTERNAL DIMENSION SYSTOLIC: 3.4 CM
ECHO LV IVSD: 1.2 CM (ref 0.6–1)
ECHO LV MASS 2D: 199.6 G (ref 88–224)
ECHO LV MASS INDEX 2D: 98.8 G/M2 (ref 49–115)
ECHO LV POSTERIOR WALL DIASTOLIC: 1.1 CM (ref 0.6–1)
ECHO LV RELATIVE WALL THICKNESS RATIO: 0.47
ECHO LVOT AV VTI INDEX: 0.81
ECHO LVOT MEAN GRADIENT: 2 MMHG
ECHO LVOT PEAK GRADIENT: 3 MMHG
ECHO LVOT PEAK VELOCITY: 0.9 M/S
ECHO LVOT VTI: 17.3 CM
ECHO RIGHT VENTRICULAR SYSTOLIC PRESSURE (RVSP): 19 MMHG
ECHO RV FREE WALL PEAK S': 11 CM/S
ECHO RV TAPSE: 2.2 CM (ref 1.7–?)
ECHO TV REGURGITANT MAX VELOCITY: 2.01 M/S
ECHO TV REGURGITANT PEAK GRADIENT: 16 MMHG

## 2024-06-04 PROCEDURE — 3079F DIAST BP 80-89 MM HG: CPT | Performed by: SPECIALIST

## 2024-06-04 PROCEDURE — 99214 OFFICE O/P EST MOD 30 MIN: CPT | Performed by: SPECIALIST

## 2024-06-04 PROCEDURE — G8427 DOCREV CUR MEDS BY ELIG CLIN: HCPCS | Performed by: SPECIALIST

## 2024-06-04 PROCEDURE — 1036F TOBACCO NON-USER: CPT | Performed by: SPECIALIST

## 2024-06-04 PROCEDURE — 1123F ACP DISCUSS/DSCN MKR DOCD: CPT | Performed by: SPECIALIST

## 2024-06-04 PROCEDURE — 3017F COLORECTAL CA SCREEN DOC REV: CPT | Performed by: SPECIALIST

## 2024-06-04 PROCEDURE — 93306 TTE W/DOPPLER COMPLETE: CPT | Performed by: SPECIALIST

## 2024-06-04 PROCEDURE — 3075F SYST BP GE 130 - 139MM HG: CPT | Performed by: SPECIALIST

## 2024-06-04 PROCEDURE — G8419 CALC BMI OUT NRM PARAM NOF/U: HCPCS | Performed by: SPECIALIST

## 2024-06-04 NOTE — PROGRESS NOTES
dilatation, bicuspid AV cannot be excluded   7/8/22-EF 56%, Findings consistent with mild concentric hypertrophy.  Visually the EF appears to be between 50-55%. Mild global hypokinesis, Tricuspid,  Mild sclerosis of AV cusp., mod AI, mild MR, LAE,Dilated aortic root., 5.0cm. Moderately dilated ascending  aorta, 5.1cm  10/3/23-  EF 56%, mod AI, Descending aorta reversal VTI is 11.5 cm, Trileaflet AV - Mildly calcified cusp, mild/mod MR, mild TR, RVSP 20 mmHg, Dilated aortic root. Ao root diameter is 5.0 cm. Ao Root Index is 2.50 cm/m2. Dilated ascending aorta. Ao ascending diameter is 5.1 cm. Ao Ascending Index is 2.55 cm/m2     3. CT Chest    11/3/15 - 4.8 x 4.8 cm ascending aortic aneurysm without evidence of dissection. The descending aorta is normal in caliber   CTA of chest 05/31/16-4.8x4.8cm ascending aortic aneurysm   CTA of chest -9/13/21-1. Ascending thoracic aortic aneurysm measures 4.9 cm in diameter, grossly 4.8 cm. Descending thoracic aorta unremarkable.   2. Large fluid collection in the mid abdomen is only partially visualized, could   reflect severely distended bladder versus other indeterminant fluid collection.   Any clinical suspicion for bladder outlet obstruction? Consider dedicated   abdominal ultrasound or CT for more definitive evaluation.    4. Lipids  5/24/22- , HDL 55.2, , TG 64    5. Cardiac MRI  8/9/23-1. 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 The sinotubular junction is 42 mm.  The ascending aorta measures 52 x 50 mm. The aortic arch is mildly dilated at 30  mm. The proximal descending thoracic aorta is minimally dilated and measures 27  mm. The abdominal aorta is normal at 20 mm at the level of renal arteries. There  is no significant atherosclerotic disease. There is no aortic dissection. There  is no prior comparative studies.  2. Normal left ventricular size with mild septal hypertrophy. Normal left  ventricular systolic function. LVEF 60%.  3. Normal right 
encounter.      We discussed the expected course, resolution and complications of the diagnosis(es) in detail.  Medication risks, benefits, costs, interactions, and alternatives were discussed as indicated.  I advised him to contact the office if his condition worsens, changes or fails to improve as anticipated. He expressed understanding with the diagnosis(es) and plan        No follow-up provider specified.      I have discussed the diagnosis with  Gene Epperson and the intended plan as seen in the above orders.  Questions were answered concerning future plans.  I have discussed medication side effects and warnings with the patient as well.    Thank you,  Yinka Lee MD for involving me in the care of  Gene Epperson. Please do not hesitate to contact me for further questions/concerns.         Jethro Burch MD, [facility] [place]  [facility]      [address], [address]     [place], [place]  [address] [phone] / [phone] Fax

## 2024-06-12 RX ORDER — SPIRONOLACTONE 25 MG/1
25 TABLET ORAL DAILY
Qty: 90 TABLET | Refills: 3 | Status: SHIPPED | OUTPATIENT
Start: 2024-06-12

## 2024-10-09 DIAGNOSIS — I35.1 NONRHEUMATIC AORTIC VALVE INSUFFICIENCY: Primary | ICD-10-CM

## 2024-10-09 DIAGNOSIS — I71.21 ANEURYSM OF ASCENDING AORTA WITHOUT RUPTURE (HCC): ICD-10-CM

## 2024-12-25 ENCOUNTER — HOSPITAL ENCOUNTER (INPATIENT)
Facility: HOSPITAL | Age: 74
LOS: 6 days | Discharge: HOME | DRG: 872 | End: 2025-01-01
Attending: EMERGENCY MEDICINE | Admitting: FAMILY MEDICINE
Payer: MEDICARE

## 2024-12-25 ENCOUNTER — APPOINTMENT (OUTPATIENT)
Facility: HOSPITAL | Age: 74
DRG: 872 | End: 2024-12-25
Payer: MEDICARE

## 2024-12-25 DIAGNOSIS — R60.0 EDEMA OF LEFT UPPER ARM: ICD-10-CM

## 2024-12-25 DIAGNOSIS — N13.30 HYDRONEPHROSIS, UNSPECIFIED HYDRONEPHROSIS TYPE: ICD-10-CM

## 2024-12-25 DIAGNOSIS — N13.4 HYDROURETER: ICD-10-CM

## 2024-12-25 DIAGNOSIS — N20.0 KIDNEY STONE: Primary | ICD-10-CM

## 2024-12-25 DIAGNOSIS — N17.9 AKI (ACUTE KIDNEY INJURY) (HCC): ICD-10-CM

## 2024-12-25 PROBLEM — N20.1 URETERAL STONE: Status: ACTIVE | Noted: 2024-12-25

## 2024-12-25 LAB
ALBUMIN SERPL-MCNC: 3.5 G/DL (ref 3.5–5)
ALBUMIN/GLOB SERPL: 0.9 (ref 1.1–2.2)
ALP SERPL-CCNC: 83 U/L (ref 45–117)
ALT SERPL-CCNC: 21 U/L (ref 12–78)
ANION GAP SERPL CALC-SCNC: 7 MMOL/L (ref 2–12)
APPEARANCE UR: CLEAR
AST SERPL-CCNC: 19 U/L (ref 15–37)
BACTERIA URNS QL MICRO: NEGATIVE /HPF
BASOPHILS # BLD: 0 K/UL (ref 0–0.1)
BASOPHILS NFR BLD: 0 % (ref 0–1)
BILIRUB SERPL-MCNC: 0.4 MG/DL (ref 0.2–1)
BILIRUB UR QL: NEGATIVE
BUN SERPL-MCNC: 35 MG/DL (ref 6–20)
BUN/CREAT SERPL: 12 (ref 12–20)
CALCIUM SERPL-MCNC: 9.7 MG/DL (ref 8.5–10.1)
CHLORIDE SERPL-SCNC: 107 MMOL/L (ref 97–108)
CO2 SERPL-SCNC: 25 MMOL/L (ref 21–32)
COLOR UR: ABNORMAL
CREAT SERPL-MCNC: 2.93 MG/DL (ref 0.7–1.3)
DIFFERENTIAL METHOD BLD: NORMAL
EOSINOPHIL # BLD: 0.3 K/UL (ref 0–0.4)
EOSINOPHIL NFR BLD: 3 % (ref 0–7)
EPITH CASTS URNS QL MICRO: ABNORMAL /LPF
ERYTHROCYTE [DISTWIDTH] IN BLOOD BY AUTOMATED COUNT: 14 % (ref 11.5–14.5)
GLOBULIN SER CALC-MCNC: 3.8 G/DL (ref 2–4)
GLUCOSE SERPL-MCNC: 139 MG/DL (ref 65–100)
GLUCOSE UR STRIP.AUTO-MCNC: NEGATIVE MG/DL
HCT VFR BLD AUTO: 38.8 % (ref 36.6–50.3)
HGB BLD-MCNC: 13 G/DL (ref 12.1–17)
HGB UR QL STRIP: ABNORMAL
HYALINE CASTS URNS QL MICRO: ABNORMAL /LPF (ref 0–2)
IMM GRANULOCYTES # BLD AUTO: 0 K/UL (ref 0–0.04)
IMM GRANULOCYTES NFR BLD AUTO: 0 % (ref 0–0.5)
KETONES UR QL STRIP.AUTO: NEGATIVE MG/DL
LEUKOCYTE ESTERASE UR QL STRIP.AUTO: ABNORMAL
LYMPHOCYTES # BLD: 1.5 K/UL (ref 0.8–3.5)
LYMPHOCYTES NFR BLD: 17 % (ref 12–49)
MCH RBC QN AUTO: 30 PG (ref 26–34)
MCHC RBC AUTO-ENTMCNC: 33.5 G/DL (ref 30–36.5)
MCV RBC AUTO: 89.4 FL (ref 80–99)
MONOCYTES # BLD: 0.7 K/UL (ref 0–1)
MONOCYTES NFR BLD: 7 % (ref 5–13)
NEUTS SEG # BLD: 6.5 K/UL (ref 1.8–8)
NEUTS SEG NFR BLD: 73 % (ref 32–75)
NITRITE UR QL STRIP.AUTO: NEGATIVE
NRBC # BLD: 0 K/UL (ref 0–0.01)
NRBC BLD-RTO: 0 PER 100 WBC
PH UR STRIP: 5 (ref 5–8)
PLATELET # BLD AUTO: 224 K/UL (ref 150–400)
PMV BLD AUTO: 10 FL (ref 8.9–12.9)
POTASSIUM SERPL-SCNC: 3.4 MMOL/L (ref 3.5–5.1)
PROT SERPL-MCNC: 7.3 G/DL (ref 6.4–8.2)
PROT UR STRIP-MCNC: 30 MG/DL
RBC # BLD AUTO: 4.34 M/UL (ref 4.1–5.7)
RBC #/AREA URNS HPF: >100 /HPF (ref 0–5)
SODIUM SERPL-SCNC: 139 MMOL/L (ref 136–145)
SP GR UR REFRACTOMETRY: 1.01 (ref 1–1.03)
SPECIMEN HOLD: NORMAL
UROBILINOGEN UR QL STRIP.AUTO: 0.2 EU/DL (ref 0.2–1)
WBC # BLD AUTO: 8.9 K/UL (ref 4.1–11.1)
WBC URNS QL MICRO: ABNORMAL /HPF (ref 0–4)

## 2024-12-25 PROCEDURE — 81001 URINALYSIS AUTO W/SCOPE: CPT

## 2024-12-25 PROCEDURE — 6360000002 HC RX W HCPCS: Performed by: EMERGENCY MEDICINE

## 2024-12-25 PROCEDURE — 36415 COLL VENOUS BLD VENIPUNCTURE: CPT

## 2024-12-25 PROCEDURE — 6370000000 HC RX 637 (ALT 250 FOR IP): Performed by: FAMILY MEDICINE

## 2024-12-25 PROCEDURE — 82570 ASSAY OF URINE CREATININE: CPT

## 2024-12-25 PROCEDURE — 85025 COMPLETE CBC W/AUTO DIFF WBC: CPT

## 2024-12-25 PROCEDURE — 87086 URINE CULTURE/COLONY COUNT: CPT

## 2024-12-25 PROCEDURE — 6360000002 HC RX W HCPCS

## 2024-12-25 PROCEDURE — 80053 COMPREHEN METABOLIC PANEL: CPT

## 2024-12-25 PROCEDURE — 2500000003 HC RX 250 WO HCPCS: Performed by: FAMILY MEDICINE

## 2024-12-25 PROCEDURE — G0378 HOSPITAL OBSERVATION PER HR: HCPCS

## 2024-12-25 PROCEDURE — 94761 N-INVAS EAR/PLS OXIMETRY MLT: CPT

## 2024-12-25 PROCEDURE — 74176 CT ABD & PELVIS W/O CONTRAST: CPT

## 2024-12-25 PROCEDURE — 6360000002 HC RX W HCPCS: Performed by: FAMILY MEDICINE

## 2024-12-25 PROCEDURE — 96374 THER/PROPH/DIAG INJ IV PUSH: CPT

## 2024-12-25 PROCEDURE — 99285 EMERGENCY DEPT VISIT HI MDM: CPT

## 2024-12-25 PROCEDURE — 2580000003 HC RX 258: Performed by: FAMILY MEDICINE

## 2024-12-25 RX ORDER — SODIUM CHLORIDE 9 MG/ML
INJECTION, SOLUTION INTRAVENOUS CONTINUOUS
Status: DISCONTINUED | OUTPATIENT
Start: 2024-12-25 | End: 2024-12-29

## 2024-12-25 RX ORDER — TAMSULOSIN HYDROCHLORIDE 0.4 MG/1
0.4 CAPSULE ORAL DAILY
Status: DISCONTINUED | OUTPATIENT
Start: 2024-12-25 | End: 2025-01-01 | Stop reason: HOSPADM

## 2024-12-25 RX ORDER — SODIUM CHLORIDE 0.9 % (FLUSH) 0.9 %
5-40 SYRINGE (ML) INJECTION PRN
Status: DISCONTINUED | OUTPATIENT
Start: 2024-12-25 | End: 2025-01-01 | Stop reason: HOSPADM

## 2024-12-25 RX ORDER — MORPHINE SULFATE 4 MG/ML
4 INJECTION, SOLUTION INTRAMUSCULAR; INTRAVENOUS
Status: COMPLETED | OUTPATIENT
Start: 2024-12-25 | End: 2024-12-25

## 2024-12-25 RX ORDER — HYDRALAZINE HYDROCHLORIDE 20 MG/ML
5 INJECTION INTRAMUSCULAR; INTRAVENOUS EVERY 6 HOURS PRN
Status: DISCONTINUED | OUTPATIENT
Start: 2024-12-25 | End: 2025-01-01 | Stop reason: HOSPADM

## 2024-12-25 RX ORDER — SODIUM CHLORIDE 9 MG/ML
INJECTION, SOLUTION INTRAVENOUS PRN
Status: DISCONTINUED | OUTPATIENT
Start: 2024-12-25 | End: 2025-01-01 | Stop reason: HOSPADM

## 2024-12-25 RX ORDER — POLYETHYLENE GLYCOL 3350 17 G/17G
17 POWDER, FOR SOLUTION ORAL DAILY PRN
Status: DISCONTINUED | OUTPATIENT
Start: 2024-12-25 | End: 2025-01-01 | Stop reason: HOSPADM

## 2024-12-25 RX ORDER — ONDANSETRON 2 MG/ML
4 INJECTION INTRAMUSCULAR; INTRAVENOUS EVERY 6 HOURS PRN
Status: DISCONTINUED | OUTPATIENT
Start: 2024-12-25 | End: 2025-01-01 | Stop reason: HOSPADM

## 2024-12-25 RX ORDER — ACETAMINOPHEN 650 MG/1
650 SUPPOSITORY RECTAL EVERY 6 HOURS PRN
Status: DISCONTINUED | OUTPATIENT
Start: 2024-12-25 | End: 2025-01-01 | Stop reason: HOSPADM

## 2024-12-25 RX ORDER — LATANOPROST 50 UG/ML
1 SOLUTION/ DROPS OPHTHALMIC NIGHTLY
Status: DISCONTINUED | OUTPATIENT
Start: 2024-12-25 | End: 2025-01-01 | Stop reason: HOSPADM

## 2024-12-25 RX ORDER — SODIUM CHLORIDE 0.9 % (FLUSH) 0.9 %
5-40 SYRINGE (ML) INJECTION EVERY 12 HOURS SCHEDULED
Status: DISCONTINUED | OUTPATIENT
Start: 2024-12-25 | End: 2025-01-01 | Stop reason: HOSPADM

## 2024-12-25 RX ORDER — ACETAMINOPHEN 325 MG/1
650 TABLET ORAL EVERY 6 HOURS PRN
Status: DISCONTINUED | OUTPATIENT
Start: 2024-12-25 | End: 2025-01-01 | Stop reason: HOSPADM

## 2024-12-25 RX ORDER — ONDANSETRON 4 MG/1
4 TABLET, ORALLY DISINTEGRATING ORAL EVERY 8 HOURS PRN
Status: DISCONTINUED | OUTPATIENT
Start: 2024-12-25 | End: 2025-01-01 | Stop reason: HOSPADM

## 2024-12-25 RX ADMIN — SODIUM CHLORIDE: 9 INJECTION, SOLUTION INTRAVENOUS at 20:55

## 2024-12-25 RX ADMIN — LATANOPROST 1 DROP: 50 SOLUTION OPHTHALMIC at 20:58

## 2024-12-25 RX ADMIN — HYDROMORPHONE HYDROCHLORIDE 0.5 MG: 1 INJECTION, SOLUTION INTRAMUSCULAR; INTRAVENOUS; SUBCUTANEOUS at 22:31

## 2024-12-25 RX ADMIN — ACETAMINOPHEN 650 MG: 325 TABLET ORAL at 14:56

## 2024-12-25 RX ADMIN — TAMSULOSIN HYDROCHLORIDE 0.4 MG: 0.4 CAPSULE ORAL at 12:34

## 2024-12-25 RX ADMIN — ONDANSETRON 4 MG: 4 TABLET, ORALLY DISINTEGRATING ORAL at 21:00

## 2024-12-25 RX ADMIN — HYDROMORPHONE HYDROCHLORIDE 0.5 MG: 1 INJECTION, SOLUTION INTRAMUSCULAR; INTRAVENOUS; SUBCUTANEOUS at 16:46

## 2024-12-25 RX ADMIN — HYDROMORPHONE HYDROCHLORIDE 0.5 MG: 1 INJECTION, SOLUTION INTRAMUSCULAR; INTRAVENOUS; SUBCUTANEOUS at 12:34

## 2024-12-25 RX ADMIN — MORPHINE SULFATE 4 MG: 4 INJECTION INTRAVENOUS at 06:42

## 2024-12-25 RX ADMIN — HYDROMORPHONE HYDROCHLORIDE 0.5 MG: 1 INJECTION, SOLUTION INTRAMUSCULAR; INTRAVENOUS; SUBCUTANEOUS at 20:51

## 2024-12-25 RX ADMIN — SODIUM CHLORIDE, PRESERVATIVE FREE 10 ML: 5 INJECTION INTRAVENOUS at 20:52

## 2024-12-25 RX ADMIN — SODIUM CHLORIDE: 9 INJECTION, SOLUTION INTRAVENOUS at 12:46

## 2024-12-25 ASSESSMENT — PAIN - FUNCTIONAL ASSESSMENT
PAIN_FUNCTIONAL_ASSESSMENT: ACTIVITIES ARE NOT PREVENTED
PAIN_FUNCTIONAL_ASSESSMENT: 0-10
PAIN_FUNCTIONAL_ASSESSMENT: ACTIVITIES ARE NOT PREVENTED
PAIN_FUNCTIONAL_ASSESSMENT: ACTIVITIES ARE NOT PREVENTED

## 2024-12-25 ASSESSMENT — PAIN SCALES - GENERAL
PAINLEVEL_OUTOF10: 10
PAINLEVEL_OUTOF10: 5
PAINLEVEL_OUTOF10: 6
PAINLEVEL_OUTOF10: 8
PAINLEVEL_OUTOF10: 6
PAINLEVEL_OUTOF10: 10
PAINLEVEL_OUTOF10: 8
PAINLEVEL_OUTOF10: 3
PAINLEVEL_OUTOF10: 10
PAINLEVEL_OUTOF10: 6
PAINLEVEL_OUTOF10: 10

## 2024-12-25 ASSESSMENT — PAIN DESCRIPTION - DESCRIPTORS
DESCRIPTORS: ACHING
DESCRIPTORS: SHARP;SHOOTING
DESCRIPTORS: ACHING

## 2024-12-25 ASSESSMENT — PAIN DESCRIPTION - ORIENTATION
ORIENTATION: RIGHT
ORIENTATION: RIGHT;LOWER
ORIENTATION: RIGHT
ORIENTATION: RIGHT
ORIENTATION: RIGHT;POSTERIOR;ANTERIOR

## 2024-12-25 ASSESSMENT — PAIN DESCRIPTION - LOCATION
LOCATION: BACK
LOCATION: ABDOMEN;BACK
LOCATION: BACK
LOCATION: BACK;FLANK
LOCATION: ABDOMEN;BACK

## 2024-12-25 ASSESSMENT — LIFESTYLE VARIABLES
HOW MANY STANDARD DRINKS CONTAINING ALCOHOL DO YOU HAVE ON A TYPICAL DAY: PATIENT DOES NOT DRINK
HOW OFTEN DO YOU HAVE A DRINK CONTAINING ALCOHOL: NEVER

## 2024-12-25 NOTE — ED TRIAGE NOTES
Pt ambulatory into the ED with complaints of back pain that start about an hour ago. Pt explains that the pain starts in the RLQ and radiates .  Describes the pain as an \"aching\" feeling    Denies nausea, vomiting, diarrhea or fevers    Endorses having a history of kidney stones and gallstones     Pt self caths due to bladder being too large

## 2024-12-25 NOTE — H&P
PMHx Capacious bladder, thoracic aortic aneurysm who presents with right flank pain for 2 to 3 days.  The pain is gotten progressively worse and was severe overnight.  It starts in his back and radiates to his right abdomen.  Patient also reports that he had 1 episode of emesis while in the ER after drinking some very cold water.  He denies any other symptoms.  Denies fevers, chills, chest pain, shortness of breath, rashes, or any other systemic symptoms.    Of note patient has a history of capacious bladder for which he does and clean straight cath once daily.  He is unable to cath his bladder until it has failed to sufficient size.  He has had multiple laser surgeries for this which has been unable to correct the problem.  His bladder has a capacity of approximately 3 L.          ED course: CT scan showed a right ureteral stone with mild hydro and ureteral nephrosis.  The ureteral stone is 5 mm in size.  Patient was given pain medications and urology was consulted.      Available records were reviewed at the time of H&P.        Past medical history: Capacious bladder, thoracic aortic aneurysm.  Hypertension.  Social History     Tobacco Use    Smoking status: Former     Current packs/day: 0.00     Average packs/day: 0.5 packs/day for 40.0 years (20.0 ttl pk-yrs)     Types: Cigarettes     Start date:      Quit date:      Years since quittin.0    Smokeless tobacco: Never   Substance Use Topics    Alcohol use: Not on file   Rarely drinks alcohol.  No drug use.  Family history: Patient reports no significant family history    Allergies   Allergen Reactions    Potassium Other (See Comments)     Heartburn, indigestion    Statins Myalgia     Muscle pain, and pt says caused \"UTI\"    Niacin Rash        Prior to Admission medications    Medication Sig Start Date End Date Taking? Authorizing Provider   spironolactone (ALDACTONE) 25 MG tablet TAKE 1 TABLET BY MOUTH DAILY 24   Jethro Schultz MD   Omega-3

## 2024-12-25 NOTE — PLAN OF CARE
Problem: Discharge Planning  Goal: Discharge to home or other facility with appropriate resources  Outcome: /[facility] Progressing  Flowsheets (Taken 12/25/2024 1151)  Discharge to home or other facility with appropriate resources: Identify barriers to discharge with patient and caregiver     Problem: Pain  Goal: Verbalizes/displays adequate comfort level or baseline comfort level  Outcome: /[facility] Progressing     Problem: Skin/Tissue Integrity  Goal: Absence of new skin breakdown  Description: 1.  Monitor for areas of redness and/or skin breakdown  2.  Assess vascular access sites hourly  3.  Every 4-6 hours minimum:  Change oxygen saturation probe site  4.  Every 4-6 hours:  If on nasal continuous positive airway pressure, respiratory therapy assess nares and determine need for appliance change or resting period.  Outcome: /[facility] Progressing     Problem: ABCDS Injury Assessment  Goal: Absence of physical injury  Outcome: /[facility] Progressing

## 2024-12-25 NOTE — CONSULTS
Urology consult    Patient: Gene Epperson MRN: [id number]  SSN: [id number]    YOB: 1950  Age: 74 y.o.  Sex: male    Requesting Physician: Roque Urena MD PCP: Yinka Lee MD     Date of Consultation:  December 25, 2024           HPI: He is a 74 y.o. male with h/o capacious bladder performing CIC daily and h/o kidney stones. Followed by Dr. Valle, spends [place]onez in [place] and up visiting for 3 days. Began having low back pain, came to ER. Associated with 5mm mid right ureteral calculus and ANA Cr~3, lytes oK. Severity mild. Timing constant, duration hours. ER staff unable to place martin but patient performed CIC in ER w/o difficulty     Assessment/Plan:     Reviewed CT findings and labs, advised expulsive therapy with Flomax, no signs of infection  Perform CIC as needed  Being admitted to medical service to monitor Cr, advised NPO after midnight in case needs stent     Past Medical History: (Complete 2+/3 areas)  Allergies   Allergen Reactions    Potassium Other (See Comments)     Heartburn, indigestion    Statins Myalgia     Muscle pain, and pt says caused \"UTI\"    Niacin Rash      Prior to Admission medications    Medication Sig Start Date End Date Taking? Authorizing Provider   spironolactone (ALDACTONE) 25 MG tablet TAKE 1 TABLET BY MOUTH DAILY 6/12/24   Jethro Schultz MD   Omega-3 1000 MG CAPS Take 1 capsule by mouth every other day    Provider, MD Erika   Multiple Vitamins-Minerals (OCUVITE PRESERVISION PO) Take 2 tablets by mouth    ProviderErika MD   vitamin D3 (CHOLECALCIFEROL) 125 MCG (5000 UT) TABS tablet Take 1 tablet by mouth every other day    Automatic Reconciliation, Ar   latanoprost (XALATAN) 0.005 % ophthalmic solution Apply 0.005 drops to eye daily    Automatic Reconciliation, Ar   pyridoxine (B-6) 100 MG tablet Take 1 tablet by mouth    Automatic Reconciliation, Ar      PMHx:  has no past medical history on file.   PSurgHx:  has no past

## 2024-12-25 NOTE — ED NOTES
Bedside and Verbal shift change report given to zane rn (oncoming nurse) by viviane garrett (offgoing nurse). Report included the following information ED SBAR, MAR, and Recent Results.

## 2024-12-25 NOTE — ED NOTES
TRANSFER - OUT REPORT:    Verbal report given to Shannan RN on Gene Epperson  being transferred to 3rd floor for routine progression of patient care       Report consisted of patient's Situation, Background, Assessment and   Recommendations(SBAR).     Information from the following report(s) Index, MAR, Recent Results, Cardiac Rhythm NSR, and Neuro Assessment was reviewed with the receiving nurse.    [place] Fall Assessment:    Presents to emergency department  because of falls (Syncope, seizure, or loss of consciousness): No  Age > 70: Yes  Altered Mental Status, Intoxication with alcohol or substance confusion (Disorientation, impaired judgment, poor safety awaremess, or inability to follow instructions): No  Impaired Mobility: Ambulates or transfers with assistive devices or assistance; Unable to ambulate or transer.: Yes  Nursing Judgement: Yes          Lines:   Peripheral IV 12/25/24 Left Antecubital (Active)        Opportunity for questions and clarification was provided.      Patient transported with:  Monitor and Tech

## 2024-12-25 NOTE — ED PROVIDER NOTES
sounds.   Pulmonary:      Effort: Pulmonary effort is normal.      Breath sounds: Normal breath sounds. No wheezing.   Abdominal:      General: Abdomen is flat. Bowel sounds are normal. There is no distension.      Palpations: Abdomen is soft.      Tenderness: There is no abdominal tenderness. There is no right CVA tenderness, left CVA tenderness or guarding.   Musculoskeletal:         General: No swelling or tenderness. Normal range of motion.      Cervical back: Normal range of motion and neck supple. No rigidity or tenderness.   Skin:     General: Skin is warm and dry.      Capillary Refill: Capillary refill takes less than 2 seconds.   Neurological:      General: No focal deficit present.      Mental Status: He is alert and oriented to person, place, and time. Mental status is at baseline.      Cranial Nerves: No cranial nerve deficit.   Psychiatric:         Mood and Affect: Mood normal.         Behavior: Behavior normal.         Thought Content: Thought content normal.         Judgment: Judgment normal.           ED Course:    ED Course as of 12/25/24 1229   Wed Dec 25, 2024   0815 Creatinine(!): 2.93  Worsened from baseline. [JR]   0815 WBC: 8.9 [JR]   0815 Hemoglobin Quant: 13.0 [JR]   0815 Hematocrit: 38.8 [JR]   0815 Platelet Count: 224 [JR]   0816 CT of the ab/pelvis showed mild right-sided hydroureteronephrosis, a 5 mm calculus in the mid ureter on the right, renal calculi, and a punctate calculus in the left kidney. [JR]   0923 Leukocyte Esterase, Urine(!): SMALL [JR]   0924 RBC, UA(!): >100 [JR]   0924 Bacteria, UA: Negative [JR]      ED Course User Index  [JR] Godfrey Quintero,              Laboratory Results:  Labs Reviewed   COMPREHENSIVE METABOLIC PANEL - Abnormal; Notable for the following components:       Result Value    Potassium 3.4 (*)     Glucose 139 (*)     BUN 35 (*)     Creatinine 2.93 (*)     Est, Glom Filt Rate 21 (*)     Albumin/Globulin Ratio 0.9 (*)     All other components within

## 2024-12-25 NOTE — ED NOTES
Attempted to place martin with Layla BROWN as 2nd assistant. Obtained urine and drained bladder but unable to inflate balloon without pain. Martin removed and attending provider, Dr. Quintero notified at this time. Patient reports he self-caths and has a history of \"blockage\". \"I usually have to wait till my bladder is completely full before I can successfully straight cath\".

## 2024-12-26 ENCOUNTER — ANESTHESIA (OUTPATIENT)
Facility: HOSPITAL | Age: 74
End: 2024-12-26
Payer: MEDICARE

## 2024-12-26 ENCOUNTER — ANESTHESIA EVENT (OUTPATIENT)
Facility: HOSPITAL | Age: 74
End: 2024-12-26
Payer: MEDICARE

## 2024-12-26 ENCOUNTER — APPOINTMENT (OUTPATIENT)
Facility: HOSPITAL | Age: 74
DRG: 872 | End: 2024-12-26
Payer: MEDICARE

## 2024-12-26 PROBLEM — A41.9 SEPSIS (HCC): Status: ACTIVE | Noted: 2024-12-26

## 2024-12-26 LAB
ALBUMIN SERPL-MCNC: 2.9 G/DL (ref 3.5–5)
ALBUMIN/GLOB SERPL: 1 (ref 1.1–2.2)
ALP SERPL-CCNC: 67 U/L (ref 45–117)
ALT SERPL-CCNC: 16 U/L (ref 12–78)
ANION GAP SERPL CALC-SCNC: 6 MMOL/L (ref 2–12)
AST SERPL-CCNC: 12 U/L (ref 15–37)
BASOPHILS # BLD: 0 K/UL (ref 0–0.1)
BASOPHILS NFR BLD: 0 % (ref 0–1)
BILIRUB SERPL-MCNC: 0.6 MG/DL (ref 0.2–1)
BUN SERPL-MCNC: 35 MG/DL (ref 6–20)
BUN/CREAT SERPL: 11 (ref 12–20)
CALCIUM SERPL-MCNC: 8.6 MG/DL (ref 8.5–10.1)
CHLORIDE SERPL-SCNC: 109 MMOL/L (ref 97–108)
CO2 SERPL-SCNC: 22 MMOL/L (ref 21–32)
CREAT SERPL-MCNC: 3.06 MG/DL (ref 0.7–1.3)
DIFFERENTIAL METHOD BLD: ABNORMAL
EOSINOPHIL # BLD: 0 K/UL (ref 0–0.4)
EOSINOPHIL NFR BLD: 0 % (ref 0–7)
ERYTHROCYTE [DISTWIDTH] IN BLOOD BY AUTOMATED COUNT: 14.2 % (ref 11.5–14.5)
GLOBULIN SER CALC-MCNC: 2.9 G/DL (ref 2–4)
GLUCOSE SERPL-MCNC: 135 MG/DL (ref 65–100)
HCT VFR BLD AUTO: 35.9 % (ref 36.6–50.3)
HGB BLD-MCNC: 12 G/DL (ref 12.1–17)
IMM GRANULOCYTES # BLD AUTO: 0.1 K/UL (ref 0–0.04)
IMM GRANULOCYTES NFR BLD AUTO: 1 % (ref 0–0.5)
LACTATE BLD-SCNC: 1.14 MMOL/L (ref 0.4–2)
LYMPHOCYTES # BLD: 0.6 K/UL (ref 0.8–3.5)
LYMPHOCYTES NFR BLD: 5 % (ref 12–49)
MCH RBC QN AUTO: 30 PG (ref 26–34)
MCHC RBC AUTO-ENTMCNC: 33.4 G/DL (ref 30–36.5)
MCV RBC AUTO: 89.8 FL (ref 80–99)
MONOCYTES # BLD: 1.3 K/UL (ref 0–1)
MONOCYTES NFR BLD: 10 % (ref 5–13)
NEUTS SEG # BLD: 10.7 K/UL (ref 1.8–8)
NEUTS SEG NFR BLD: 84 % (ref 32–75)
NRBC # BLD: 0 K/UL (ref 0–0.01)
NRBC BLD-RTO: 0 PER 100 WBC
PLATELET # BLD AUTO: 164 K/UL (ref 150–400)
PMV BLD AUTO: 10.2 FL (ref 8.9–12.9)
POTASSIUM SERPL-SCNC: 3.9 MMOL/L (ref 3.5–5.1)
PROT SERPL-MCNC: 5.8 G/DL (ref 6.4–8.2)
RBC # BLD AUTO: 4 M/UL (ref 4.1–5.7)
RBC MORPH BLD: ABNORMAL
SODIUM SERPL-SCNC: 137 MMOL/L (ref 136–145)
WBC # BLD AUTO: 12.7 K/UL (ref 4.1–11.1)

## 2024-12-26 PROCEDURE — 2500000003 HC RX 250 WO HCPCS: Performed by: NURSE ANESTHETIST, CERTIFIED REGISTERED

## 2024-12-26 PROCEDURE — C1758 CATHETER, URETERAL: HCPCS | Performed by: UROLOGY

## 2024-12-26 PROCEDURE — 0TJB8ZZ INSPECTION OF BLADDER, VIA NATURAL OR ARTIFICIAL OPENING ENDOSCOPIC: ICD-10-PCS | Performed by: UROLOGY

## 2024-12-26 PROCEDURE — 1100000000 HC RM PRIVATE

## 2024-12-26 PROCEDURE — 3700000001 HC ADD 15 MINUTES (ANESTHESIA): Performed by: UROLOGY

## 2024-12-26 PROCEDURE — 3700000000 HC ANESTHESIA ATTENDED CARE: Performed by: UROLOGY

## 2024-12-26 PROCEDURE — 6370000000 HC RX 637 (ALT 250 FOR IP): Performed by: UROLOGY

## 2024-12-26 PROCEDURE — 2580000003 HC RX 258: Performed by: FAMILY MEDICINE

## 2024-12-26 PROCEDURE — 3600000003 HC SURGERY LEVEL 3 BASE: Performed by: UROLOGY

## 2024-12-26 PROCEDURE — 6360000002 HC RX W HCPCS: Performed by: FAMILY MEDICINE

## 2024-12-26 PROCEDURE — 2709999900 HC NON-CHARGEABLE SUPPLY: Performed by: UROLOGY

## 2024-12-26 PROCEDURE — 85025 COMPLETE CBC W/AUTO DIFF WBC: CPT

## 2024-12-26 PROCEDURE — 6370000000 HC RX 637 (ALT 250 FOR IP): Performed by: FAMILY MEDICINE

## 2024-12-26 PROCEDURE — 80053 COMPREHEN METABOLIC PANEL: CPT

## 2024-12-26 PROCEDURE — 7100000001 HC PACU RECOVERY - ADDTL 15 MIN: Performed by: UROLOGY

## 2024-12-26 PROCEDURE — 87040 BLOOD CULTURE FOR BACTERIA: CPT

## 2024-12-26 PROCEDURE — 2500000003 HC RX 250 WO HCPCS: Performed by: FAMILY MEDICINE

## 2024-12-26 PROCEDURE — C1769 GUIDE WIRE: HCPCS | Performed by: UROLOGY

## 2024-12-26 PROCEDURE — 7100000000 HC PACU RECOVERY - FIRST 15 MIN: Performed by: UROLOGY

## 2024-12-26 PROCEDURE — 74176 CT ABD & PELVIS W/O CONTRAST: CPT

## 2024-12-26 PROCEDURE — 3600000013 HC SURGERY LEVEL 3 ADDTL 15MIN: Performed by: UROLOGY

## 2024-12-26 PROCEDURE — 6360000002 HC RX W HCPCS: Performed by: NURSE ANESTHETIST, CERTIFIED REGISTERED

## 2024-12-26 PROCEDURE — 83605 ASSAY OF LACTIC ACID: CPT

## 2024-12-26 PROCEDURE — 36415 COLL VENOUS BLD VENIPUNCTURE: CPT

## 2024-12-26 PROCEDURE — 94761 N-INVAS EAR/PLS OXIMETRY MLT: CPT

## 2024-12-26 RX ORDER — SODIUM CHLORIDE 0.9 % (FLUSH) 0.9 %
5-40 SYRINGE (ML) INJECTION EVERY 12 HOURS SCHEDULED
Status: DISCONTINUED | OUTPATIENT
Start: 2024-12-26 | End: 2025-01-01 | Stop reason: HOSPADM

## 2024-12-26 RX ORDER — LIDOCAINE HYDROCHLORIDE 20 MG/ML
JELLY TOPICAL PRN
Status: DISCONTINUED | OUTPATIENT
Start: 2024-12-26 | End: 2024-12-26 | Stop reason: HOSPADM

## 2024-12-26 RX ORDER — PROPOFOL 10 MG/ML
INJECTION, EMULSION INTRAVENOUS
Status: DISCONTINUED | OUTPATIENT
Start: 2024-12-26 | End: 2024-12-26 | Stop reason: SDUPTHER

## 2024-12-26 RX ORDER — SUCCINYLCHOLINE/SOD CL,ISO/PF 100 MG/5ML
SYRINGE (ML) INTRAVENOUS
Status: DISCONTINUED | OUTPATIENT
Start: 2024-12-26 | End: 2024-12-26 | Stop reason: SDUPTHER

## 2024-12-26 RX ORDER — FENTANYL CITRATE 50 UG/ML
INJECTION, SOLUTION INTRAMUSCULAR; INTRAVENOUS
Status: DISCONTINUED | OUTPATIENT
Start: 2024-12-26 | End: 2024-12-26 | Stop reason: SDUPTHER

## 2024-12-26 RX ORDER — SODIUM CHLORIDE 9 MG/ML
INJECTION, SOLUTION INTRAVENOUS PRN
Status: DISCONTINUED | OUTPATIENT
Start: 2024-12-26 | End: 2025-01-01 | Stop reason: HOSPADM

## 2024-12-26 RX ORDER — AMLODIPINE BESYLATE 10 MG/1
10 TABLET ORAL DAILY
COMMUNITY

## 2024-12-26 RX ORDER — DEXAMETHASONE SODIUM PHOSPHATE 4 MG/ML
INJECTION, SOLUTION INTRA-ARTICULAR; INTRALESIONAL; INTRAMUSCULAR; INTRAVENOUS; SOFT TISSUE
Status: DISCONTINUED | OUTPATIENT
Start: 2024-12-26 | End: 2024-12-26 | Stop reason: SDUPTHER

## 2024-12-26 RX ORDER — EPHEDRINE SULFATE/0.9% NACL/PF 25 MG/5 ML
SYRINGE (ML) INTRAVENOUS
Status: DISCONTINUED | OUTPATIENT
Start: 2024-12-26 | End: 2024-12-26 | Stop reason: SDUPTHER

## 2024-12-26 RX ORDER — PHENYLEPHRINE HCL IN 0.9% NACL 0.4MG/10ML
SYRINGE (ML) INTRAVENOUS
Status: DISCONTINUED | OUTPATIENT
Start: 2024-12-26 | End: 2024-12-26 | Stop reason: SDUPTHER

## 2024-12-26 RX ORDER — SODIUM CHLORIDE 0.9 % (FLUSH) 0.9 %
5-40 SYRINGE (ML) INJECTION PRN
Status: DISCONTINUED | OUTPATIENT
Start: 2024-12-26 | End: 2025-01-01 | Stop reason: HOSPADM

## 2024-12-26 RX ORDER — ROCURONIUM BROMIDE 10 MG/ML
INJECTION, SOLUTION INTRAVENOUS
Status: DISCONTINUED | OUTPATIENT
Start: 2024-12-26 | End: 2024-12-26 | Stop reason: SDUPTHER

## 2024-12-26 RX ADMIN — Medication 140 MG: at 15:53

## 2024-12-26 RX ADMIN — PROPOFOL 150 MG: 10 INJECTION, EMULSION INTRAVENOUS at 15:53

## 2024-12-26 RX ADMIN — ROCURONIUM BROMIDE 5 MG: 10 INJECTION INTRAVENOUS at 15:51

## 2024-12-26 RX ADMIN — HYDROMORPHONE HYDROCHLORIDE 0.5 MG: 1 INJECTION, SOLUTION INTRAMUSCULAR; INTRAVENOUS; SUBCUTANEOUS at 17:55

## 2024-12-26 RX ADMIN — LATANOPROST 1 DROP: 50 SOLUTION OPHTHALMIC at 20:40

## 2024-12-26 RX ADMIN — SUGAMMADEX 200 MG: 100 INJECTION, SOLUTION INTRAVENOUS at 16:27

## 2024-12-26 RX ADMIN — SODIUM CHLORIDE, PRESERVATIVE FREE 10 ML: 5 INJECTION INTRAVENOUS at 20:41

## 2024-12-26 RX ADMIN — SODIUM CHLORIDE, PRESERVATIVE FREE 10 ML: 5 INJECTION INTRAVENOUS at 20:39

## 2024-12-26 RX ADMIN — FENTANYL CITRATE 50 MCG: 50 INJECTION, SOLUTION INTRAMUSCULAR; INTRAVENOUS at 16:09

## 2024-12-26 RX ADMIN — Medication 40 MCG: at 16:23

## 2024-12-26 RX ADMIN — Medication 100 MCG: at 16:03

## 2024-12-26 RX ADMIN — DEXAMETHASONE SODIUM PHOSPHATE 4 MG: 4 INJECTION INTRA-ARTICULAR; INTRALESIONAL; INTRAMUSCULAR; INTRAVENOUS; SOFT TISSUE at 16:00

## 2024-12-26 RX ADMIN — PROPOFOL 20 MG: 10 INJECTION, EMULSION INTRAVENOUS at 16:14

## 2024-12-26 RX ADMIN — POLYETHYLENE GLYCOL 3350 17 G: 17 POWDER, FOR SOLUTION ORAL at 20:39

## 2024-12-26 RX ADMIN — SODIUM CHLORIDE, PRESERVATIVE FREE 10 ML: 5 INJECTION INTRAVENOUS at 08:44

## 2024-12-26 RX ADMIN — PROPOFOL 20 MG: 10 INJECTION, EMULSION INTRAVENOUS at 16:30

## 2024-12-26 RX ADMIN — Medication 20 MG: at 15:51

## 2024-12-26 RX ADMIN — TAMSULOSIN HYDROCHLORIDE 0.4 MG: 0.4 CAPSULE ORAL at 08:43

## 2024-12-26 RX ADMIN — SODIUM CHLORIDE: 9 INJECTION, SOLUTION INTRAVENOUS at 18:00

## 2024-12-26 RX ADMIN — FENTANYL CITRATE 50 MCG: 50 INJECTION, SOLUTION INTRAMUSCULAR; INTRAVENOUS at 15:51

## 2024-12-26 RX ADMIN — WATER 2000 MG: 1 INJECTION INTRAMUSCULAR; INTRAVENOUS; SUBCUTANEOUS at 15:59

## 2024-12-26 RX ADMIN — Medication 100 MCG: at 16:08

## 2024-12-26 RX ADMIN — SODIUM CHLORIDE: 9 INJECTION, SOLUTION INTRAVENOUS at 05:19

## 2024-12-26 RX ADMIN — PROPOFOL 30 MG: 10 INJECTION, EMULSION INTRAVENOUS at 16:11

## 2024-12-26 RX ADMIN — ROCURONIUM BROMIDE 15 MG: 10 INJECTION INTRAVENOUS at 16:11

## 2024-12-26 RX ADMIN — EPHEDRINE SULFATE 10 MG: 5 INJECTION INTRAVENOUS at 16:19

## 2024-12-26 RX ADMIN — Medication 80 MCG: at 16:16

## 2024-12-26 RX ADMIN — Medication 80 MCG: at 16:21

## 2024-12-26 ASSESSMENT — PAIN DESCRIPTION - DESCRIPTORS: DESCRIPTORS: ACHING

## 2024-12-26 ASSESSMENT — PAIN DESCRIPTION - LOCATION: LOCATION: BACK

## 2024-12-26 ASSESSMENT — PAIN SCALES - GENERAL
PAINLEVEL_OUTOF10: 0
PAINLEVEL_OUTOF10: 0
PAINLEVEL_OUTOF10: 7

## 2024-12-26 ASSESSMENT — PAIN - FUNCTIONAL ASSESSMENT
PAIN_FUNCTIONAL_ASSESSMENT: 0-10
PAIN_FUNCTIONAL_ASSESSMENT: ACTIVITIES ARE NOT PREVENTED

## 2024-12-26 ASSESSMENT — PAIN DESCRIPTION - ORIENTATION: ORIENTATION: RIGHT;LOWER

## 2024-12-26 NOTE — ANESTHESIA PRE PROCEDURE
Department of Anesthesiology  Preprocedure Note       Name:  Gene Epperson   Age:  74 y.o.  :  1950                                          MRN:  [id number]         Date:  2024      Surgeon: Surgeon(s):  Markus Mar II, MD    Procedure: Procedure(s):  CYSTOSCOPY RIGHT URETERAL STENT INSERTION    Medications prior to admission:   Prior to Admission medications    Medication Sig Start Date End Date Taking? Authorizing Provider   spironolactone (ALDACTONE) 25 MG tablet TAKE 1 TABLET BY MOUTH DAILY 24   Jethro Schultz MD   Omega-3 1000 MG CAPS Take 1 capsule by mouth every other day    Provider, MD Erika   Multiple Vitamins-Minerals (OCUVITE PRESERVISION PO) Take 2 tablets by mouth    ProviderErika MD   vitamin D3 (CHOLECALCIFEROL) 125 MCG (5000 UT) TABS tablet Take 1 tablet by mouth every other day    Automatic Reconciliation, Ar   latanoprost (XALATAN) 0.005 % ophthalmic solution Apply 0.005 drops to eye daily    Automatic Reconciliation, Ar   pyridoxine (B-6) 100 MG tablet Take 1 tablet by mouth    Automatic Reconciliation, Ar       Current medications:    Current Facility-Administered Medications   Medication Dose Route Frequency Provider Last Rate Last Admin   • cefTRIAXone (ROCEPHIN) 2,000 mg in sterile water 20 mL IV syringe  2,000 mg IntraVENous Q24H Roque Urena MD       • sodium chloride flush 0.9 % injection 5-40 mL  5-40 mL IntraVENous 2 times per day Roque Urena MD       • sodium chloride flush 0.9 % injection 5-40 mL  5-40 mL IntraVENous PRN Roque Urena MD       • 0.9 % sodium chloride infusion   IntraVENous PRN Roque Urena MD       • tamsulosin (FLOMAX) capsule 0.4 mg  0.4 mg Oral Daily Roque Urena MD   0.4 mg at 24 0843   • latanoprost (XALATAN) 0.005 % ophthalmic solution 1 drop  1 drop Right Eye Nightly Roque Urena MD   1 drop at 24   • sodium chloride flush 0.9 % injection 5-40 mL  5-40 mL IntraVENous 2 times per

## 2024-12-26 NOTE — PLAN OF CARE
Problem: Pain  Goal: Verbalizes/displays adequate comfort level or baseline comfort level  12/25/2024 2337 by Emmie Ahuja RN  Outcome: Progressing  12/25/2024 1818 by Vero Whipple RN  Outcome: /\[facility]\"" Progressing     Problem: Skin/Tissue Integrity  Goal: Absence of new skin breakdown  Description: 1.  Monitor for areas of redness and/or skin breakdown  2.  Assess vascular access sites hourly  3.  Every 4-6 hours minimum:  Change oxygen saturation probe site  4.  Every 4-6 hours:  If on nasal continuous positive airway pressure, respiratory therapy assess nares and determine need for appliance change or resting period.  12/25/2024 2337 by Emmie Ahuja RN  Outcome: Progressing  12/25/2024 1818 by Vero Whipple RN  Outcome: /\[facility]\"" Progressing     Problem: Safety - Adult  Goal: Free from fall injury  Outcome: Progressing

## 2024-12-26 NOTE — BRIEF OP NOTE
Brief Postoperative Note    [id number]  Patient: Gene Epperson  YOB: 1950  MRN: [id number]    Date of Procedure: 12/26/2024    Pre-Op Diagnosis Codes:      * Ureteral stone [N20.1]    Post-Op Diagnosis: Same       Procedure(s):  CYSTOSCOPY    Surgeon(s):  Markus Mar II, MD    Assistant:  * No surgical staff found *    Anesthesia: General    Estimated Blood Loss (mL): Minimal    Complications: None    Specimens:   * No specimens in log *    Implants:  * No implants in log *      Drains: * No LDAs found *    Findings:  Infection Present At Time Of Surgery (PATOS) (choose all levels that have infection present):  No infection present  Other Findings: scarring at bladder neck, high riding BN, unable to find UOs      Disposition - to Floor, pt will need antegrade stent placement via IR   Electronically signed by Markus Mar II, MD on 12/26/2024 at 4:32 PM

## 2024-12-26 NOTE — ANESTHESIA POSTPROCEDURE EVALUATION
Department of Anesthesiology  Postprocedure Note    Patient: Gene Epperson  MRN: [id number]  YOB: 1950  Date of evaluation: 12/26/2024    Procedure Summary       Date: 12/26/24 Room / Location: [facility] MAIN OR  / [facility] MAIN OR    Anesthesia Start: 1546 Anesthesia Stop: 1650    Procedure: CYSTOSCOPY (Right: Bladder) Diagnosis:       Ureteral stone      (Ureteral stone [N20.1])    Surgeons: Markus Mar II, MD Responsible Provider: Carlito Carter MD    Anesthesia Type: General ASA Status: 3            Anesthesia Type: General    Tex Phase I: Tex Score: 9    Tex Phase II:      Anesthesia Post Evaluation    Patient location during evaluation: PACU  Patient participation: complete - patient participated  Level of consciousness: awake and alert  Pain score: 0  Airway patency: patent  Nausea & Vomiting: no vomiting and no nausea  Cardiovascular status: hemodynamically stable  Respiratory status: room air  Hydration status: stable  Multimodal analgesia pain management approach    No notable events documented.

## 2024-12-27 ENCOUNTER — APPOINTMENT (OUTPATIENT)
Facility: HOSPITAL | Age: 74
DRG: 872 | End: 2024-12-27
Payer: MEDICARE

## 2024-12-27 LAB
ALBUMIN SERPL-MCNC: 2.5 G/DL (ref 3.5–5)
ALBUMIN/GLOB SERPL: 0.7 (ref 1.1–2.2)
ALP SERPL-CCNC: 59 U/L (ref 45–117)
ALT SERPL-CCNC: 18 U/L (ref 12–78)
ANION GAP SERPL CALC-SCNC: 6 MMOL/L (ref 2–12)
AST SERPL-CCNC: 14 U/L (ref 15–37)
BASOPHILS # BLD: 0 K/UL (ref 0–0.1)
BASOPHILS NFR BLD: 0 % (ref 0–1)
BILIRUB SERPL-MCNC: 0.5 MG/DL (ref 0.2–1)
BUN SERPL-MCNC: 34 MG/DL (ref 6–20)
BUN/CREAT SERPL: 12 (ref 12–20)
CALCIUM SERPL-MCNC: 9.6 MG/DL (ref 8.5–10.1)
CHLORIDE SERPL-SCNC: 110 MMOL/L (ref 97–108)
CO2 SERPL-SCNC: 20 MMOL/L (ref 21–32)
CREAT SERPL-MCNC: 2.89 MG/DL (ref 0.7–1.3)
CREAT UR-MCNC: 50 MG/DL
DIFFERENTIAL METHOD BLD: ABNORMAL
ECHO BSA: 2.06 M2
EOSINOPHIL # BLD: 0 K/UL (ref 0–0.4)
EOSINOPHIL NFR BLD: 0 % (ref 0–7)
ERYTHROCYTE [DISTWIDTH] IN BLOOD BY AUTOMATED COUNT: 14.3 % (ref 11.5–14.5)
GLOBULIN SER CALC-MCNC: 3.6 G/DL (ref 2–4)
GLUCOSE SERPL-MCNC: 127 MG/DL (ref 65–100)
HCT VFR BLD AUTO: 34.2 % (ref 36.6–50.3)
HGB BLD-MCNC: 11.3 G/DL (ref 12.1–17)
IMM GRANULOCYTES # BLD AUTO: 0 K/UL (ref 0–0.04)
IMM GRANULOCYTES NFR BLD AUTO: 0 % (ref 0–0.5)
LYMPHOCYTES # BLD: 0.6 K/UL (ref 0.8–3.5)
LYMPHOCYTES NFR BLD: 4 % (ref 12–49)
MCH RBC QN AUTO: 29.7 PG (ref 26–34)
MCHC RBC AUTO-ENTMCNC: 33 G/DL (ref 30–36.5)
MCV RBC AUTO: 90 FL (ref 80–99)
MONOCYTES # BLD: 1.2 K/UL (ref 0–1)
MONOCYTES NFR BLD: 8 % (ref 5–13)
NEUTS SEG # BLD: 13.2 K/UL (ref 1.8–8)
NEUTS SEG NFR BLD: 88 % (ref 32–75)
NRBC # BLD: 0 K/UL (ref 0–0.01)
NRBC BLD-RTO: 0 PER 100 WBC
PLATELET # BLD AUTO: 167 K/UL (ref 150–400)
PMV BLD AUTO: 10.6 FL (ref 8.9–12.9)
POTASSIUM SERPL-SCNC: 4.1 MMOL/L (ref 3.5–5.1)
PROT SERPL-MCNC: 6.1 G/DL (ref 6.4–8.2)
RBC # BLD AUTO: 3.8 M/UL (ref 4.1–5.7)
RBC MORPH BLD: ABNORMAL
SODIUM SERPL-SCNC: 136 MMOL/L (ref 136–145)
SODIUM UR-SCNC: 18 MMOL/L
SPECIMEN HOLD: NORMAL
WBC # BLD AUTO: 15 K/UL (ref 4.1–11.1)

## 2024-12-27 PROCEDURE — 2709999900 HC NON-CHARGEABLE SUPPLY

## 2024-12-27 PROCEDURE — 84300 ASSAY OF URINE SODIUM: CPT

## 2024-12-27 PROCEDURE — 50430 NJX PX NFROSGRM &/URTRGRM: CPT

## 2024-12-27 PROCEDURE — 2580000003 HC RX 258: Performed by: FAMILY MEDICINE

## 2024-12-27 PROCEDURE — 99153 MOD SED SAME PHYS/QHP EA: CPT

## 2024-12-27 PROCEDURE — 0T9330Z DRAINAGE OF RIGHT KIDNEY PELVIS WITH DRAINAGE DEVICE, PERCUTANEOUS APPROACH: ICD-10-PCS | Performed by: RADIOLOGY

## 2024-12-27 PROCEDURE — 36415 COLL VENOUS BLD VENIPUNCTURE: CPT

## 2024-12-27 PROCEDURE — 6360000002 HC RX W HCPCS: Performed by: FAMILY MEDICINE

## 2024-12-27 PROCEDURE — 94761 N-INVAS EAR/PLS OXIMETRY MLT: CPT

## 2024-12-27 PROCEDURE — C1887 CATHETER, GUIDING: HCPCS

## 2024-12-27 PROCEDURE — 6370000000 HC RX 637 (ALT 250 FOR IP): Performed by: FAMILY MEDICINE

## 2024-12-27 PROCEDURE — 2500000003 HC RX 250 WO HCPCS: Performed by: FAMILY MEDICINE

## 2024-12-27 PROCEDURE — 85025 COMPLETE CBC W/AUTO DIFF WBC: CPT

## 2024-12-27 PROCEDURE — C1769 GUIDE WIRE: HCPCS

## 2024-12-27 PROCEDURE — 80053 COMPREHEN METABOLIC PANEL: CPT

## 2024-12-27 PROCEDURE — 2700000000 HC OXYGEN THERAPY PER DAY

## 2024-12-27 PROCEDURE — C1729 CATH, DRAINAGE: HCPCS

## 2024-12-27 PROCEDURE — 77002 NEEDLE LOCALIZATION BY XRAY: CPT

## 2024-12-27 PROCEDURE — C1894 INTRO/SHEATH, NON-LASER: HCPCS

## 2024-12-27 PROCEDURE — 1100000000 HC RM PRIVATE

## 2024-12-27 PROCEDURE — 99152 MOD SED SAME PHYS/QHP 5/>YRS: CPT

## 2024-12-27 PROCEDURE — 6360000004 HC RX CONTRAST MEDICATION: Performed by: RADIOLOGY

## 2024-12-27 PROCEDURE — 6360000002 HC RX W HCPCS: Performed by: RADIOLOGY

## 2024-12-27 RX ORDER — FENTANYL CITRATE 50 UG/ML
INJECTION, SOLUTION INTRAMUSCULAR; INTRAVENOUS PRN
Status: COMPLETED | OUTPATIENT
Start: 2024-12-27 | End: 2024-12-27

## 2024-12-27 RX ORDER — IOPAMIDOL 612 MG/ML
INJECTION, SOLUTION INTRAVASCULAR PRN
Status: COMPLETED | OUTPATIENT
Start: 2024-12-27 | End: 2024-12-27

## 2024-12-27 RX ORDER — LIDOCAINE HYDROCHLORIDE 10 MG/ML
INJECTION, SOLUTION EPIDURAL; INFILTRATION; INTRACAUDAL; PERINEURAL PRN
Status: COMPLETED | OUTPATIENT
Start: 2024-12-27 | End: 2024-12-27

## 2024-12-27 RX ORDER — MIDAZOLAM HYDROCHLORIDE 5 MG/ML
INJECTION, SOLUTION INTRAMUSCULAR; INTRAVENOUS PRN
Status: COMPLETED | OUTPATIENT
Start: 2024-12-27 | End: 2024-12-27

## 2024-12-27 RX ADMIN — MIDAZOLAM HYDROCHLORIDE 1 MG: 5 INJECTION, SOLUTION INTRAMUSCULAR; INTRAVENOUS at 11:19

## 2024-12-27 RX ADMIN — MIDAZOLAM HYDROCHLORIDE 1 MG: 5 INJECTION, SOLUTION INTRAMUSCULAR; INTRAVENOUS at 10:57

## 2024-12-27 RX ADMIN — FENTANYL CITRATE 50 MCG: 50 INJECTION, SOLUTION INTRAMUSCULAR; INTRAVENOUS at 10:57

## 2024-12-27 RX ADMIN — ACETAMINOPHEN 650 MG: 325 TABLET ORAL at 22:51

## 2024-12-27 RX ADMIN — FENTANYL CITRATE 50 MCG: 50 INJECTION, SOLUTION INTRAMUSCULAR; INTRAVENOUS at 11:33

## 2024-12-27 RX ADMIN — SODIUM CHLORIDE, PRESERVATIVE FREE 10 ML: 5 INJECTION INTRAVENOUS at 20:22

## 2024-12-27 RX ADMIN — LATANOPROST 1 DROP: 50 SOLUTION OPHTHALMIC at 20:22

## 2024-12-27 RX ADMIN — HYDROMORPHONE HYDROCHLORIDE 0.5 MG: 1 INJECTION, SOLUTION INTRAMUSCULAR; INTRAVENOUS; SUBCUTANEOUS at 12:22

## 2024-12-27 RX ADMIN — LIDOCAINE HYDROCHLORIDE 5 ML: 10 INJECTION, SOLUTION EPIDURAL; INFILTRATION; INTRACAUDAL; PERINEURAL at 10:29

## 2024-12-27 RX ADMIN — SODIUM CHLORIDE: 9 INJECTION, SOLUTION INTRAVENOUS at 02:31

## 2024-12-27 RX ADMIN — WATER 2000 MG: 1 INJECTION INTRAMUSCULAR; INTRAVENOUS; SUBCUTANEOUS at 15:56

## 2024-12-27 RX ADMIN — IOPAMIDOL 60 ML: 612 INJECTION, SOLUTION INTRAVENOUS at 12:02

## 2024-12-27 RX ADMIN — HYDROMORPHONE HYDROCHLORIDE 0.5 MG: 1 INJECTION, SOLUTION INTRAMUSCULAR; INTRAVENOUS; SUBCUTANEOUS at 02:22

## 2024-12-27 RX ADMIN — POLYETHYLENE GLYCOL 3350 17 G: 17 POWDER, FOR SOLUTION ORAL at 18:01

## 2024-12-27 RX ADMIN — FENTANYL CITRATE 50 MCG: 50 INJECTION, SOLUTION INTRAMUSCULAR; INTRAVENOUS at 10:28

## 2024-12-27 RX ADMIN — HYDROMORPHONE HYDROCHLORIDE 0.5 MG: 1 INJECTION, SOLUTION INTRAMUSCULAR; INTRAVENOUS; SUBCUTANEOUS at 22:47

## 2024-12-27 RX ADMIN — MIDAZOLAM HYDROCHLORIDE 2 MG: 5 INJECTION, SOLUTION INTRAMUSCULAR; INTRAVENOUS at 10:28

## 2024-12-27 RX ADMIN — MIDAZOLAM HYDROCHLORIDE 1 MG: 5 INJECTION, SOLUTION INTRAMUSCULAR; INTRAVENOUS at 11:50

## 2024-12-27 RX ADMIN — SODIUM CHLORIDE: 9 INJECTION, SOLUTION INTRAVENOUS at 14:31

## 2024-12-27 RX ADMIN — ONDANSETRON 4 MG: 4 TABLET, ORALLY DISINTEGRATING ORAL at 19:01

## 2024-12-27 RX ADMIN — HYDROMORPHONE HYDROCHLORIDE 0.5 MG: 1 INJECTION, SOLUTION INTRAMUSCULAR; INTRAVENOUS; SUBCUTANEOUS at 06:03

## 2024-12-27 RX ADMIN — ACETAMINOPHEN 650 MG: 325 TABLET ORAL at 04:35

## 2024-12-27 RX ADMIN — FENTANYL CITRATE 50 MCG: 50 INJECTION, SOLUTION INTRAMUSCULAR; INTRAVENOUS at 11:44

## 2024-12-27 RX ADMIN — HYDROMORPHONE HYDROCHLORIDE 0.5 MG: 1 INJECTION, SOLUTION INTRAMUSCULAR; INTRAVENOUS; SUBCUTANEOUS at 18:01

## 2024-12-27 ASSESSMENT — PAIN DESCRIPTION - LOCATION
LOCATION: BACK
LOCATION: OTHER (COMMENT)
LOCATION: BACK

## 2024-12-27 ASSESSMENT — PAIN SCALES - GENERAL
PAINLEVEL_OUTOF10: 10
PAINLEVEL_OUTOF10: 10
PAINLEVEL_OUTOF10: 5
PAINLEVEL_OUTOF10: 10
PAINLEVEL_OUTOF10: 7
PAINLEVEL_OUTOF10: 0
PAINLEVEL_OUTOF10: 5
PAINLEVEL_OUTOF10: 7
PAINLEVEL_OUTOF10: 10
PAINLEVEL_OUTOF10: 3
PAINLEVEL_OUTOF10: 3
PAINLEVEL_OUTOF10: 0
PAINLEVEL_OUTOF10: 10
PAINLEVEL_OUTOF10: 7

## 2024-12-27 ASSESSMENT — PAIN - FUNCTIONAL ASSESSMENT: PAIN_FUNCTIONAL_ASSESSMENT: ACTIVITIES ARE NOT PREVENTED

## 2024-12-27 ASSESSMENT — PAIN DESCRIPTION - ORIENTATION
ORIENTATION: RIGHT;LOWER
ORIENTATION: RIGHT

## 2024-12-27 ASSESSMENT — PAIN DESCRIPTION - DESCRIPTORS
DESCRIPTORS: ACHING
DESCRIPTORS: ACHING;NAGGING
DESCRIPTORS: ACHING
DESCRIPTORS: ACHING

## 2024-12-27 NOTE — PLAN OF CARE
Problem: Discharge Planning  Goal: Discharge to home or other facility with appropriate resources  Outcome: Progressing  Flowsheets (Taken 12/26/2024 2000)  Discharge to home or other facility with appropriate resources: Identify barriers to discharge with patient and caregiver     Problem: Pain  Goal: Verbalizes/displays adequate comfort level or baseline comfort level  Outcome: Progressing

## 2024-12-27 NOTE — OP NOTE
[facility]          [address] [place]  [address]                            OPERATIVE REPORT      PATIENT NAME: REHAN RAINES              : 1950  MED REC NO: [id number]                       ROOM: [facility]  ACCOUNT NO: [id number]                       ADMIT DATE: 2024  PROVIDER: Markus Mar III, MD    DATE OF SERVICE:  2024    PREOPERATIVE DIAGNOSES:  Right ureteral stone.    POSTOPERATIVE DIAGNOSES:  Right ureteral stone.    PROCEDURES PERFORMED:  ***    SURGEON:  Markus Mar III, MD    ASSISTANT:  None.    ANESTHESIA:  General.    ESTIMATED BLOOD LOSS:  Minimal.    SPECIMENS REMOVED:  None.    INTRAOPERATIVE FINDINGS:  ***     COMPLICATIONS:  None.    IMPLANTS:  None.    INDICATIONS:  A 74-year-old with a right ureteral stone and acute renal failure.    DESCRIPTION OF PROCEDURE:  After informed consent was obtained, the patient was taken to the operating room, placed on table in the supine position.  Sequential compression devices were administered to the lower extremity.  Then, the patient was administered general anesthetic.  He was then positioned in dorsal lithotomy position for rigid cystoscopy. The area was then prepped and draped in usual sterile fashion for rigid cystoscopy.  After a time-out was performed and preop antibiotics administered, rigid cystoscopy commenced with identification of a tight bladder neck, high-riding bladder neck.  The patient had a very capacious bladder after a significant amount of time of probing and looking for the ureteral orifices, we were unable to identify them and it is likely that I am unable to see them because of the rigidity of the bladder neck, high-riding bladder neck, slight protrusion/median lobe into the urinary bladder.  After greater than 30 minutes was used to look for the ureteral orifice, the decision was made to conclude the procedure.  The patient was then awakened, extubated,

## 2024-12-28 LAB
ALBUMIN SERPL-MCNC: 2.4 G/DL (ref 3.5–5)
ALBUMIN/GLOB SERPL: 0.8 (ref 1.1–2.2)
ALP SERPL-CCNC: 80 U/L (ref 45–117)
ALT SERPL-CCNC: 30 U/L (ref 12–78)
ANION GAP SERPL CALC-SCNC: 6 MMOL/L (ref 2–12)
AST SERPL-CCNC: 30 U/L (ref 15–37)
BACTERIA SPEC CULT: NORMAL
BASOPHILS # BLD: 0 K/UL (ref 0–0.1)
BASOPHILS NFR BLD: 0 % (ref 0–1)
BILIRUB SERPL-MCNC: 0.8 MG/DL (ref 0.2–1)
BUN SERPL-MCNC: 30 MG/DL (ref 6–20)
BUN/CREAT SERPL: 14 (ref 12–20)
CALCIUM SERPL-MCNC: 9.2 MG/DL (ref 8.5–10.1)
CHLORIDE SERPL-SCNC: 108 MMOL/L (ref 97–108)
CO2 SERPL-SCNC: 22 MMOL/L (ref 21–32)
CREAT SERPL-MCNC: 2.19 MG/DL (ref 0.7–1.3)
DIFFERENTIAL METHOD BLD: ABNORMAL
EOSINOPHIL # BLD: 0 K/UL (ref 0–0.4)
EOSINOPHIL NFR BLD: 0 % (ref 0–7)
ERYTHROCYTE [DISTWIDTH] IN BLOOD BY AUTOMATED COUNT: 14.3 % (ref 11.5–14.5)
GLOBULIN SER CALC-MCNC: 3.1 G/DL (ref 2–4)
GLUCOSE SERPL-MCNC: 99 MG/DL (ref 65–100)
HCT VFR BLD AUTO: 32.4 % (ref 36.6–50.3)
HGB BLD-MCNC: 10.9 G/DL (ref 12.1–17)
IMM GRANULOCYTES # BLD AUTO: 0.2 K/UL (ref 0–0.04)
IMM GRANULOCYTES NFR BLD AUTO: 1 % (ref 0–0.5)
LACTATE BLD-SCNC: 0.92 MMOL/L (ref 0.4–2)
LYMPHOCYTES # BLD: 0.8 K/UL (ref 0.8–3.5)
LYMPHOCYTES NFR BLD: 5 % (ref 12–49)
MCH RBC QN AUTO: 30.1 PG (ref 26–34)
MCHC RBC AUTO-ENTMCNC: 33.6 G/DL (ref 30–36.5)
MCV RBC AUTO: 89.5 FL (ref 80–99)
MONOCYTES # BLD: 1.4 K/UL (ref 0–1)
MONOCYTES NFR BLD: 9 % (ref 5–13)
NEUTS SEG # BLD: 13.5 K/UL (ref 1.8–8)
NEUTS SEG NFR BLD: 85 % (ref 32–75)
NRBC # BLD: 0 K/UL (ref 0–0.01)
NRBC BLD-RTO: 0 PER 100 WBC
PLATELET # BLD AUTO: 144 K/UL (ref 150–400)
PMV BLD AUTO: 10.1 FL (ref 8.9–12.9)
POTASSIUM SERPL-SCNC: 4.2 MMOL/L (ref 3.5–5.1)
PROT SERPL-MCNC: 5.5 G/DL (ref 6.4–8.2)
RBC # BLD AUTO: 3.62 M/UL (ref 4.1–5.7)
RBC MORPH BLD: ABNORMAL
SERVICE CMNT-IMP: NORMAL
SODIUM SERPL-SCNC: 136 MMOL/L (ref 136–145)
WBC # BLD AUTO: 15.9 K/UL (ref 4.1–11.1)

## 2024-12-28 PROCEDURE — 6360000002 HC RX W HCPCS: Performed by: INTERNAL MEDICINE

## 2024-12-28 PROCEDURE — 6370000000 HC RX 637 (ALT 250 FOR IP): Performed by: FAMILY MEDICINE

## 2024-12-28 PROCEDURE — 36415 COLL VENOUS BLD VENIPUNCTURE: CPT

## 2024-12-28 PROCEDURE — 80053 COMPREHEN METABOLIC PANEL: CPT

## 2024-12-28 PROCEDURE — 6360000002 HC RX W HCPCS: Performed by: FAMILY MEDICINE

## 2024-12-28 PROCEDURE — 85025 COMPLETE CBC W/AUTO DIFF WBC: CPT

## 2024-12-28 PROCEDURE — 94761 N-INVAS EAR/PLS OXIMETRY MLT: CPT

## 2024-12-28 PROCEDURE — 83605 ASSAY OF LACTIC ACID: CPT

## 2024-12-28 PROCEDURE — 2580000003 HC RX 258: Performed by: FAMILY MEDICINE

## 2024-12-28 PROCEDURE — 2580000003 HC RX 258: Performed by: INTERNAL MEDICINE

## 2024-12-28 PROCEDURE — 2500000003 HC RX 250 WO HCPCS: Performed by: FAMILY MEDICINE

## 2024-12-28 PROCEDURE — 1100000000 HC RM PRIVATE

## 2024-12-28 PROCEDURE — 6370000000 HC RX 637 (ALT 250 FOR IP): Performed by: INTERNAL MEDICINE

## 2024-12-28 PROCEDURE — 2700000000 HC OXYGEN THERAPY PER DAY

## 2024-12-28 RX ORDER — AMLODIPINE BESYLATE 5 MG/1
10 TABLET ORAL DAILY
Status: DISCONTINUED | OUTPATIENT
Start: 2024-12-28 | End: 2025-01-01 | Stop reason: HOSPADM

## 2024-12-28 RX ADMIN — LATANOPROST 1 DROP: 50 SOLUTION OPHTHALMIC at 20:56

## 2024-12-28 RX ADMIN — HYDROMORPHONE HYDROCHLORIDE 0.5 MG: 1 INJECTION, SOLUTION INTRAMUSCULAR; INTRAVENOUS; SUBCUTANEOUS at 10:23

## 2024-12-28 RX ADMIN — SODIUM CHLORIDE, PRESERVATIVE FREE 20 ML: 5 INJECTION INTRAVENOUS at 20:57

## 2024-12-28 RX ADMIN — ACETAMINOPHEN 650 MG: 325 TABLET ORAL at 16:01

## 2024-12-28 RX ADMIN — HYDROMORPHONE HYDROCHLORIDE 0.5 MG: 1 INJECTION, SOLUTION INTRAMUSCULAR; INTRAVENOUS; SUBCUTANEOUS at 20:55

## 2024-12-28 RX ADMIN — PIPERACILLIN AND TAZOBACTAM 4500 MG: 4; .5 INJECTION, POWDER, FOR SOLUTION INTRAVENOUS at 16:40

## 2024-12-28 RX ADMIN — WATER 2000 MG: 1 INJECTION INTRAMUSCULAR; INTRAVENOUS; SUBCUTANEOUS at 15:10

## 2024-12-28 RX ADMIN — SODIUM CHLORIDE: 9 INJECTION, SOLUTION INTRAVENOUS at 20:57

## 2024-12-28 RX ADMIN — SODIUM CHLORIDE, PRESERVATIVE FREE 10 ML: 5 INJECTION INTRAVENOUS at 07:55

## 2024-12-28 RX ADMIN — SODIUM CHLORIDE: 9 INJECTION, SOLUTION INTRAVENOUS at 02:44

## 2024-12-28 RX ADMIN — TAMSULOSIN HYDROCHLORIDE 0.4 MG: 0.4 CAPSULE ORAL at 07:55

## 2024-12-28 RX ADMIN — ACETAMINOPHEN 650 MG: 325 TABLET ORAL at 20:55

## 2024-12-28 RX ADMIN — AMLODIPINE BESYLATE 10 MG: 5 TABLET ORAL at 13:14

## 2024-12-28 RX ADMIN — ACETAMINOPHEN 650 MG: 325 TABLET ORAL at 10:24

## 2024-12-28 ASSESSMENT — PAIN DESCRIPTION - ORIENTATION
ORIENTATION: RIGHT
ORIENTATION: RIGHT
ORIENTATION: RIGHT;LOWER;POSTERIOR

## 2024-12-28 ASSESSMENT — PAIN SCALES - GENERAL
PAINLEVEL_OUTOF10: 0
PAINLEVEL_OUTOF10: 4
PAINLEVEL_OUTOF10: 7
PAINLEVEL_OUTOF10: 0
PAINLEVEL_OUTOF10: 8
PAINLEVEL_OUTOF10: 2

## 2024-12-28 ASSESSMENT — PAIN DESCRIPTION - LOCATION
LOCATION: BACK

## 2024-12-28 ASSESSMENT — PAIN DESCRIPTION - DESCRIPTORS: DESCRIPTORS: ACHING

## 2024-12-28 NOTE — PLAN OF CARE
Problem: Discharge Planning  Goal: Discharge to home or other facility with appropriate resources  Outcome: Progressing     Problem: Pain  Goal: Verbalizes/displays adequate comfort level or baseline comfort level  12/28/2024 1052 by Alfreda Sherman, RN  Outcome: Progressing     Problem: Skin/Tissue Integrity  Goal: Absence of new skin breakdown  Description: 1.  Monitor for areas of redness and/or skin breakdown  2.  Assess vascular access sites hourly  3.  Every 4-6 hours minimum:  Change oxygen saturation probe site  4.  Every 4-6 hours:  If on nasal continuous positive airway pressure, respiratory therapy assess nares and determine need for appliance change or resting period.  12/28/2024 1052 by Alfreda Sherman, RN  Outcome: Progressing     Problem: ABCDS Injury Assessment  Goal: Absence of physical injury  Outcome: Progressing     Problem: Safety - Adult  Goal: Free from fall injury  Outcome: Progressing

## 2024-12-28 NOTE — FLOWSHEET NOTE
12/28/24 1550   Treatment Team Notification   Reason for Communication Abnormal vitals   Name of Team Member Notified [place]   Treatment Team Role Attending Provider   Method of Communication Secure Message   Response Waiting for response   Notification Time 1402     1611: Code Sepsis called per provider. RRT nurse and provider at bedside.

## 2024-12-29 ENCOUNTER — APPOINTMENT (OUTPATIENT)
Facility: HOSPITAL | Age: 74
DRG: 872 | End: 2024-12-29
Payer: MEDICARE

## 2024-12-29 LAB
ALBUMIN SERPL-MCNC: 1.9 G/DL (ref 3.5–5)
ALBUMIN/GLOB SERPL: 0.5 (ref 1.1–2.2)
ALP SERPL-CCNC: 87 U/L (ref 45–117)
ALT SERPL-CCNC: 45 U/L (ref 12–78)
ANION GAP SERPL CALC-SCNC: 3 MMOL/L (ref 2–12)
AST SERPL-CCNC: 43 U/L (ref 15–37)
BASOPHILS # BLD: 0 K/UL (ref 0–0.1)
BASOPHILS NFR BLD: 0 % (ref 0–1)
BILIRUB SERPL-MCNC: 0.8 MG/DL (ref 0.2–1)
BUN SERPL-MCNC: 30 MG/DL (ref 6–20)
BUN/CREAT SERPL: 14 (ref 12–20)
CALCIUM SERPL-MCNC: 8.8 MG/DL (ref 8.5–10.1)
CHLORIDE SERPL-SCNC: 113 MMOL/L (ref 97–108)
CO2 SERPL-SCNC: 22 MMOL/L (ref 21–32)
CREAT SERPL-MCNC: 2.12 MG/DL (ref 0.7–1.3)
DIFFERENTIAL METHOD BLD: ABNORMAL
EOSINOPHIL # BLD: 0 K/UL (ref 0–0.4)
EOSINOPHIL NFR BLD: 0 % (ref 0–7)
ERYTHROCYTE [DISTWIDTH] IN BLOOD BY AUTOMATED COUNT: 14 % (ref 11.5–14.5)
GLOBULIN SER CALC-MCNC: 3.6 G/DL (ref 2–4)
GLUCOSE SERPL-MCNC: 116 MG/DL (ref 65–100)
HCT VFR BLD AUTO: 28.3 % (ref 36.6–50.3)
HGB BLD-MCNC: 9.6 G/DL (ref 12.1–17)
IMM GRANULOCYTES # BLD AUTO: 0 K/UL (ref 0–0.04)
IMM GRANULOCYTES NFR BLD AUTO: 0 % (ref 0–0.5)
LYMPHOCYTES # BLD: 0.7 K/UL (ref 0.8–3.5)
LYMPHOCYTES NFR BLD: 6 % (ref 12–49)
MCH RBC QN AUTO: 30.4 PG (ref 26–34)
MCHC RBC AUTO-ENTMCNC: 33.9 G/DL (ref 30–36.5)
MCV RBC AUTO: 89.6 FL (ref 80–99)
MONOCYTES # BLD: 1.1 K/UL (ref 0–1)
MONOCYTES NFR BLD: 9 % (ref 5–13)
NEUTS SEG # BLD: 10.6 K/UL (ref 1.8–8)
NEUTS SEG NFR BLD: 85 % (ref 32–75)
NRBC # BLD: 0 K/UL (ref 0–0.01)
NRBC BLD-RTO: 0 PER 100 WBC
PLATELET # BLD AUTO: 143 K/UL (ref 150–400)
PMV BLD AUTO: 10.2 FL (ref 8.9–12.9)
POTASSIUM SERPL-SCNC: 4 MMOL/L (ref 3.5–5.1)
PROT SERPL-MCNC: 5.5 G/DL (ref 6.4–8.2)
RBC # BLD AUTO: 3.16 M/UL (ref 4.1–5.7)
SODIUM SERPL-SCNC: 138 MMOL/L (ref 136–145)
WBC # BLD AUTO: 12.5 K/UL (ref 4.1–11.1)

## 2024-12-29 PROCEDURE — 36415 COLL VENOUS BLD VENIPUNCTURE: CPT

## 2024-12-29 PROCEDURE — 74176 CT ABD & PELVIS W/O CONTRAST: CPT

## 2024-12-29 PROCEDURE — 2500000003 HC RX 250 WO HCPCS: Performed by: FAMILY MEDICINE

## 2024-12-29 PROCEDURE — 1100000000 HC RM PRIVATE

## 2024-12-29 PROCEDURE — 6360000002 HC RX W HCPCS: Performed by: FAMILY MEDICINE

## 2024-12-29 PROCEDURE — 6360000002 HC RX W HCPCS: Performed by: INTERNAL MEDICINE

## 2024-12-29 PROCEDURE — 85025 COMPLETE CBC W/AUTO DIFF WBC: CPT

## 2024-12-29 PROCEDURE — 6370000000 HC RX 637 (ALT 250 FOR IP): Performed by: NURSE PRACTITIONER

## 2024-12-29 PROCEDURE — 80053 COMPREHEN METABOLIC PANEL: CPT

## 2024-12-29 PROCEDURE — 94761 N-INVAS EAR/PLS OXIMETRY MLT: CPT

## 2024-12-29 PROCEDURE — 6370000000 HC RX 637 (ALT 250 FOR IP): Performed by: FAMILY MEDICINE

## 2024-12-29 PROCEDURE — 6370000000 HC RX 637 (ALT 250 FOR IP): Performed by: INTERNAL MEDICINE

## 2024-12-29 PROCEDURE — 2700000000 HC OXYGEN THERAPY PER DAY

## 2024-12-29 PROCEDURE — 2580000003 HC RX 258: Performed by: INTERNAL MEDICINE

## 2024-12-29 RX ORDER — CYCLOBENZAPRINE HCL 10 MG
5 TABLET ORAL ONCE
Status: COMPLETED | OUTPATIENT
Start: 2024-12-29 | End: 2024-12-29

## 2024-12-29 RX ORDER — CYCLOBENZAPRINE HCL 10 MG
5 TABLET ORAL ONCE
Status: DISCONTINUED | OUTPATIENT
Start: 2024-12-29 | End: 2024-12-29

## 2024-12-29 RX ORDER — SPIRONOLACTONE 25 MG/1
25 TABLET ORAL DAILY
Status: DISCONTINUED | OUTPATIENT
Start: 2024-12-29 | End: 2024-12-29

## 2024-12-29 RX ADMIN — ONDANSETRON 4 MG: 2 INJECTION, SOLUTION INTRAMUSCULAR; INTRAVENOUS at 08:28

## 2024-12-29 RX ADMIN — HYDROMORPHONE HYDROCHLORIDE 0.5 MG: 1 INJECTION, SOLUTION INTRAMUSCULAR; INTRAVENOUS; SUBCUTANEOUS at 22:15

## 2024-12-29 RX ADMIN — PIPERACILLIN AND TAZOBACTAM 3375 MG: 3; .375 INJECTION, POWDER, LYOPHILIZED, FOR SOLUTION INTRAVENOUS at 00:28

## 2024-12-29 RX ADMIN — AMLODIPINE BESYLATE 10 MG: 5 TABLET ORAL at 08:25

## 2024-12-29 RX ADMIN — HYDROMORPHONE HYDROCHLORIDE 0.5 MG: 1 INJECTION, SOLUTION INTRAMUSCULAR; INTRAVENOUS; SUBCUTANEOUS at 18:31

## 2024-12-29 RX ADMIN — SODIUM CHLORIDE, PRESERVATIVE FREE 10 ML: 5 INJECTION INTRAVENOUS at 20:29

## 2024-12-29 RX ADMIN — TAMSULOSIN HYDROCHLORIDE 0.4 MG: 0.4 CAPSULE ORAL at 08:25

## 2024-12-29 RX ADMIN — PIPERACILLIN AND TAZOBACTAM 3375 MG: 3; .375 INJECTION, POWDER, LYOPHILIZED, FOR SOLUTION INTRAVENOUS at 17:44

## 2024-12-29 RX ADMIN — ONDANSETRON 4 MG: 2 INJECTION, SOLUTION INTRAMUSCULAR; INTRAVENOUS at 22:15

## 2024-12-29 RX ADMIN — LATANOPROST 1 DROP: 50 SOLUTION OPHTHALMIC at 20:32

## 2024-12-29 RX ADMIN — PIPERACILLIN AND TAZOBACTAM 3375 MG: 3; .375 INJECTION, POWDER, LYOPHILIZED, FOR SOLUTION INTRAVENOUS at 06:37

## 2024-12-29 RX ADMIN — SODIUM CHLORIDE, PRESERVATIVE FREE 10 ML: 5 INJECTION INTRAVENOUS at 20:33

## 2024-12-29 RX ADMIN — CYCLOBENZAPRINE 5 MG: 10 TABLET, FILM COATED ORAL at 20:29

## 2024-12-29 ASSESSMENT — PAIN DESCRIPTION - DESCRIPTORS
DESCRIPTORS: ACHING;SHARP
DESCRIPTORS: ACHING

## 2024-12-29 ASSESSMENT — PAIN SCALES - GENERAL
PAINLEVEL_OUTOF10: 10
PAINLEVEL_OUTOF10: 10

## 2024-12-29 ASSESSMENT — PAIN DESCRIPTION - LOCATION
LOCATION: ABDOMEN;BACK
LOCATION: FLANK

## 2024-12-29 ASSESSMENT — PAIN - FUNCTIONAL ASSESSMENT: PAIN_FUNCTIONAL_ASSESSMENT: ACTIVITIES ARE NOT PREVENTED

## 2024-12-29 ASSESSMENT — PAIN DESCRIPTION - ORIENTATION
ORIENTATION: RIGHT
ORIENTATION: RIGHT;LEFT;ANTERIOR;POSTERIOR

## 2024-12-29 NOTE — PLAN OF CARE
Problem: Discharge Planning  Goal: Discharge to home or other facility with appropriate resources  12/28/2024 1052 by Alfreda Sherman RN  Outcome: Progressing     Problem: Pain  Goal: Verbalizes/displays adequate comfort level or baseline comfort level  12/28/2024 1052 by Alfreda Sherman RN  Outcome: Progressing     Problem: Skin/Tissue Integrity  Goal: Absence of new skin breakdown  Description: 1.  Monitor for areas of redness and/or skin breakdown  2.  Assess vascular access sites hourly  3.  Every 4-6 hours minimum:  Change oxygen saturation probe site  4.  Every 4-6 hours:  If on nasal continuous positive airway pressure, respiratory therapy assess nares and determine need for appliance change or resting period.  12/28/2024 2347 by Flakita Jade RN  Outcome: Progressing  12/28/2024 1052 by Alfreda Sherman RN  Outcome: Progressing     Problem: ABCDS Injury Assessment  Goal: Absence of physical injury  12/28/2024 1052 by Alfreda Sherman RN  Outcome: Progressing     Problem: Safety - Adult  Goal: Free from fall injury  12/28/2024 2347 by Flakita Jade RN  Outcome: Progressing  12/28/2024 1052 by Alfreda Sherman RN  Outcome: Progressing

## 2024-12-30 ENCOUNTER — APPOINTMENT (OUTPATIENT)
Dept: VASCULAR SURGERY | Facility: HOSPITAL | Age: 74
DRG: 872 | End: 2024-12-30
Attending: INTERNAL MEDICINE
Payer: MEDICARE

## 2024-12-30 LAB
ANION GAP SERPL CALC-SCNC: 6 MMOL/L (ref 2–12)
BASOPHILS # BLD: 0 K/UL (ref 0–0.1)
BASOPHILS NFR BLD: 0 % (ref 0–1)
BUN SERPL-MCNC: 25 MG/DL (ref 6–20)
BUN/CREAT SERPL: 14 (ref 12–20)
CALCIUM SERPL-MCNC: 8.9 MG/DL (ref 8.5–10.1)
CHLORIDE SERPL-SCNC: 113 MMOL/L (ref 97–108)
CO2 SERPL-SCNC: 23 MMOL/L (ref 21–32)
CREAT SERPL-MCNC: 1.74 MG/DL (ref 0.7–1.3)
DIFFERENTIAL METHOD BLD: ABNORMAL
EOSINOPHIL # BLD: 0 K/UL (ref 0–0.4)
EOSINOPHIL NFR BLD: 0 % (ref 0–7)
ERYTHROCYTE [DISTWIDTH] IN BLOOD BY AUTOMATED COUNT: 14.1 % (ref 11.5–14.5)
GLUCOSE SERPL-MCNC: 114 MG/DL (ref 65–100)
HCT VFR BLD AUTO: 29.6 % (ref 36.6–50.3)
HGB BLD-MCNC: 9.9 G/DL (ref 12.1–17)
IMM GRANULOCYTES # BLD AUTO: 0.1 K/UL (ref 0–0.04)
IMM GRANULOCYTES NFR BLD AUTO: 1 % (ref 0–0.5)
LYMPHOCYTES # BLD: 0.7 K/UL (ref 0.8–3.5)
LYMPHOCYTES NFR BLD: 7 % (ref 12–49)
MCH RBC QN AUTO: 29.6 PG (ref 26–34)
MCHC RBC AUTO-ENTMCNC: 33.4 G/DL (ref 30–36.5)
MCV RBC AUTO: 88.4 FL (ref 80–99)
MONOCYTES # BLD: 0.9 K/UL (ref 0–1)
MONOCYTES NFR BLD: 8 % (ref 5–13)
NEUTS SEG # BLD: 9 K/UL (ref 1.8–8)
NEUTS SEG NFR BLD: 84 % (ref 32–75)
NRBC # BLD: 0 K/UL (ref 0–0.01)
NRBC BLD-RTO: 0 PER 100 WBC
PLATELET # BLD AUTO: 171 K/UL (ref 150–400)
PMV BLD AUTO: 9.7 FL (ref 8.9–12.9)
POTASSIUM SERPL-SCNC: 3.8 MMOL/L (ref 3.5–5.1)
RBC # BLD AUTO: 3.35 M/UL (ref 4.1–5.7)
SODIUM SERPL-SCNC: 142 MMOL/L (ref 136–145)
WBC # BLD AUTO: 10.6 K/UL (ref 4.1–11.1)

## 2024-12-30 PROCEDURE — 2500000003 HC RX 250 WO HCPCS: Performed by: FAMILY MEDICINE

## 2024-12-30 PROCEDURE — 6360000002 HC RX W HCPCS: Performed by: INTERNAL MEDICINE

## 2024-12-30 PROCEDURE — 2580000003 HC RX 258: Performed by: INTERNAL MEDICINE

## 2024-12-30 PROCEDURE — 2580000003 HC RX 258: Performed by: FAMILY MEDICINE

## 2024-12-30 PROCEDURE — 1100000000 HC RM PRIVATE

## 2024-12-30 PROCEDURE — 80048 BASIC METABOLIC PNL TOTAL CA: CPT

## 2024-12-30 PROCEDURE — 94761 N-INVAS EAR/PLS OXIMETRY MLT: CPT

## 2024-12-30 PROCEDURE — 6370000000 HC RX 637 (ALT 250 FOR IP): Performed by: FAMILY MEDICINE

## 2024-12-30 PROCEDURE — 36415 COLL VENOUS BLD VENIPUNCTURE: CPT

## 2024-12-30 PROCEDURE — 6360000002 HC RX W HCPCS: Performed by: FAMILY MEDICINE

## 2024-12-30 PROCEDURE — 85025 COMPLETE CBC W/AUTO DIFF WBC: CPT

## 2024-12-30 PROCEDURE — 6370000000 HC RX 637 (ALT 250 FOR IP): Performed by: INTERNAL MEDICINE

## 2024-12-30 PROCEDURE — 93971 EXTREMITY STUDY: CPT

## 2024-12-30 RX ORDER — OXYCODONE AND ACETAMINOPHEN 5; 325 MG/1; MG/1
1 TABLET ORAL EVERY 4 HOURS PRN
Status: DISCONTINUED | OUTPATIENT
Start: 2024-12-30 | End: 2025-01-01 | Stop reason: HOSPADM

## 2024-12-30 RX ADMIN — PIPERACILLIN AND TAZOBACTAM 3375 MG: 3; .375 INJECTION, POWDER, LYOPHILIZED, FOR SOLUTION INTRAVENOUS at 02:08

## 2024-12-30 RX ADMIN — SODIUM CHLORIDE: 9 INJECTION, SOLUTION INTRAVENOUS at 19:23

## 2024-12-30 RX ADMIN — LATANOPROST 1 DROP: 50 SOLUTION OPHTHALMIC at 21:14

## 2024-12-30 RX ADMIN — SODIUM CHLORIDE: 9 INJECTION, SOLUTION INTRAVENOUS at 10:14

## 2024-12-30 RX ADMIN — OXYCODONE HYDROCHLORIDE AND ACETAMINOPHEN 1 TABLET: 5; 325 TABLET ORAL at 18:54

## 2024-12-30 RX ADMIN — TAMSULOSIN HYDROCHLORIDE 0.4 MG: 0.4 CAPSULE ORAL at 10:15

## 2024-12-30 RX ADMIN — PIPERACILLIN AND TAZOBACTAM 3375 MG: 3; .375 INJECTION, POWDER, LYOPHILIZED, FOR SOLUTION INTRAVENOUS at 19:23

## 2024-12-30 RX ADMIN — SODIUM CHLORIDE, PRESERVATIVE FREE 20 ML: 5 INJECTION INTRAVENOUS at 21:13

## 2024-12-30 RX ADMIN — HYDROMORPHONE HYDROCHLORIDE 0.5 MG: 1 INJECTION, SOLUTION INTRAMUSCULAR; INTRAVENOUS; SUBCUTANEOUS at 12:18

## 2024-12-30 RX ADMIN — PIPERACILLIN AND TAZOBACTAM 3375 MG: 3; .375 INJECTION, POWDER, LYOPHILIZED, FOR SOLUTION INTRAVENOUS at 10:16

## 2024-12-30 RX ADMIN — HYDROMORPHONE HYDROCHLORIDE 0.5 MG: 1 INJECTION, SOLUTION INTRAMUSCULAR; INTRAVENOUS; SUBCUTANEOUS at 04:14

## 2024-12-30 ASSESSMENT — PAIN DESCRIPTION - DESCRIPTORS
DESCRIPTORS: BURNING;ACHING
DESCRIPTORS: ACHING
DESCRIPTORS: ACHING

## 2024-12-30 ASSESSMENT — PAIN SCALES - GENERAL
PAINLEVEL_OUTOF10: 7
PAINLEVEL_OUTOF10: 0
PAINLEVEL_OUTOF10: 0
PAINLEVEL_OUTOF10: 6
PAINLEVEL_OUTOF10: 8

## 2024-12-30 ASSESSMENT — PAIN DESCRIPTION - ORIENTATION
ORIENTATION: RIGHT;LOWER
ORIENTATION: RIGHT
ORIENTATION: RIGHT;LOWER

## 2024-12-30 ASSESSMENT — PAIN DESCRIPTION - LOCATION
LOCATION: BACK
LOCATION: FLANK
LOCATION: BACK

## 2024-12-30 ASSESSMENT — PAIN - FUNCTIONAL ASSESSMENT
PAIN_FUNCTIONAL_ASSESSMENT: ACTIVITIES ARE NOT PREVENTED

## 2024-12-30 NOTE — CONSULTS
INTERVENTIONAL RADIOLOGY  Consult Note      Patient:  Gene Epperson  :  1950  Age:  74 y.o.  MRN:  [id number]    Today's Date:  2024  Admission Date:  2024  Hospital Day:  4  Consult requested by:  Lisa Reyes APRN - NP     Reason for consult:  Pt with increased right flank pain and low urine output since right nephrostomy tube placement. IR consulted to evaluate right nephrostomy tube.     CURRENT MEDICATIONS  Current Facility-Administered Medications   Medication Dose Route Frequency Provider Last Rate Last Admin    oxyCODONE-acetaminophen (PERCOCET) 5-325 MG per tablet 1 tablet  1 tablet Oral Q4H PRN Roque Duarte MD        amLODIPine (NORVASC) tablet 10 mg  10 mg Oral Daily Roque Duarte MD   10 mg at 24 0825    piperacillin-tazobactam (ZOSYN) 3,375 mg in sodium chloride 0.9 % 50 mL IVPB (mini-bag)  3,375 mg IntraVENous q8h Roque Duarte MD   Stopped at 24 1416    sodium chloride flush 0.9 % injection 5-40 mL  5-40 mL IntraVENous 2 times per day Roque Urena MD   10 mL at 24    sodium chloride flush 0.9 % injection 5-40 mL  5-40 mL IntraVENous PRN Roque Urena MD        0.9 % sodium chloride infusion   IntraVENous PRN Roque Urena MD 5 mL/hr at 24 1014 New Bag at 24 1014    tamsulosin (FLOMAX) capsule 0.4 mg  0.4 mg Oral Daily Roque Urena MD   0.4 mg at 24 1015    latanoprost (XALATAN) 0.005 % ophthalmic solution 1 drop  1 drop Right Eye Nightly Roque Urena MD   1 drop at 24    sodium chloride flush 0.9 % injection 5-40 mL  5-40 mL IntraVENous 2 times per day Roque Urena MD   10 mL at 24    sodium chloride flush 0.9 % injection 5-40 mL  5-40 mL IntraVENous PRN Roque Urena MD        0.9 % sodium chloride infusion   IntraVENous PRN Roque Urena MD        ondansetron (ZOFRAN-ODT) disintegrating tablet 4 mg  4 mg Oral Q8H PRN Roque Urena MD   4 mg at 241    Or

## 2024-12-30 NOTE — PLAN OF CARE
Problem: Discharge Planning  Goal: Discharge to home or other facility with appropriate resources  12/30/2024 0200 by Rola Tellez, RN  Outcome: Progressing     Problem: Pain  Goal: Verbalizes/displays adequate comfort level or baseline comfort level  12/30/2024 0200 by Rola Tellez, RN  Outcome: Progressing

## 2024-12-31 LAB
ANION GAP SERPL CALC-SCNC: 5 MMOL/L (ref 2–12)
BASOPHILS # BLD: 0 K/UL (ref 0–0.1)
BASOPHILS NFR BLD: 0 % (ref 0–1)
BUN SERPL-MCNC: 22 MG/DL (ref 6–20)
BUN/CREAT SERPL: 14 (ref 12–20)
CALCIUM SERPL-MCNC: 8.9 MG/DL (ref 8.5–10.1)
CHLORIDE SERPL-SCNC: 112 MMOL/L (ref 97–108)
CO2 SERPL-SCNC: 24 MMOL/L (ref 21–32)
CREAT SERPL-MCNC: 1.58 MG/DL (ref 0.7–1.3)
DIFFERENTIAL METHOD BLD: ABNORMAL
ECHO BSA: 2.06 M2
EOSINOPHIL # BLD: 0.2 K/UL (ref 0–0.4)
EOSINOPHIL NFR BLD: 2 % (ref 0–7)
ERYTHROCYTE [DISTWIDTH] IN BLOOD BY AUTOMATED COUNT: 14.3 % (ref 11.5–14.5)
GLUCOSE SERPL-MCNC: 121 MG/DL (ref 65–100)
HCT VFR BLD AUTO: 32.7 % (ref 36.6–50.3)
HGB BLD-MCNC: 11.1 G/DL (ref 12.1–17)
IMM GRANULOCYTES # BLD AUTO: 0 K/UL (ref 0–0.04)
IMM GRANULOCYTES NFR BLD AUTO: 0 % (ref 0–0.5)
LYMPHOCYTES # BLD: 1.2 K/UL (ref 0.8–3.5)
LYMPHOCYTES NFR BLD: 14 % (ref 12–49)
MCH RBC QN AUTO: 30.1 PG (ref 26–34)
MCHC RBC AUTO-ENTMCNC: 33.9 G/DL (ref 30–36.5)
MCV RBC AUTO: 88.6 FL (ref 80–99)
MONOCYTES # BLD: 0.7 K/UL (ref 0–1)
MONOCYTES NFR BLD: 9 % (ref 5–13)
NEUTS SEG # BLD: 6.1 K/UL (ref 1.8–8)
NEUTS SEG NFR BLD: 75 % (ref 32–75)
NRBC # BLD: 0 K/UL (ref 0–0.01)
NRBC BLD-RTO: 0 PER 100 WBC
PLATELET # BLD AUTO: 212 K/UL (ref 150–400)
PMV BLD AUTO: 9.7 FL (ref 8.9–12.9)
POTASSIUM SERPL-SCNC: 3.6 MMOL/L (ref 3.5–5.1)
RBC # BLD AUTO: 3.69 M/UL (ref 4.1–5.7)
SODIUM SERPL-SCNC: 141 MMOL/L (ref 136–145)
WBC # BLD AUTO: 8.1 K/UL (ref 4.1–11.1)

## 2024-12-31 PROCEDURE — 6360000002 HC RX W HCPCS: Performed by: FAMILY MEDICINE

## 2024-12-31 PROCEDURE — 2500000003 HC RX 250 WO HCPCS: Performed by: FAMILY MEDICINE

## 2024-12-31 PROCEDURE — 6370000000 HC RX 637 (ALT 250 FOR IP): Performed by: NURSE PRACTITIONER

## 2024-12-31 PROCEDURE — 36415 COLL VENOUS BLD VENIPUNCTURE: CPT

## 2024-12-31 PROCEDURE — 85025 COMPLETE CBC W/AUTO DIFF WBC: CPT

## 2024-12-31 PROCEDURE — 97161 PT EVAL LOW COMPLEX 20 MIN: CPT

## 2024-12-31 PROCEDURE — 1100000000 HC RM PRIVATE

## 2024-12-31 PROCEDURE — 6370000000 HC RX 637 (ALT 250 FOR IP): Performed by: INTERNAL MEDICINE

## 2024-12-31 PROCEDURE — 80048 BASIC METABOLIC PNL TOTAL CA: CPT

## 2024-12-31 PROCEDURE — 97116 GAIT TRAINING THERAPY: CPT

## 2024-12-31 PROCEDURE — 2580000003 HC RX 258: Performed by: INTERNAL MEDICINE

## 2024-12-31 PROCEDURE — 94761 N-INVAS EAR/PLS OXIMETRY MLT: CPT

## 2024-12-31 PROCEDURE — 6370000000 HC RX 637 (ALT 250 FOR IP): Performed by: FAMILY MEDICINE

## 2024-12-31 PROCEDURE — 6360000002 HC RX W HCPCS: Performed by: INTERNAL MEDICINE

## 2024-12-31 RX ORDER — TROSPIUM CHLORIDE 20 MG/1
20 TABLET, FILM COATED ORAL DAILY
Status: DISCONTINUED | OUTPATIENT
Start: 2024-12-31 | End: 2025-01-01 | Stop reason: HOSPADM

## 2024-12-31 RX ADMIN — PIPERACILLIN AND TAZOBACTAM 3375 MG: 3; .375 INJECTION, POWDER, LYOPHILIZED, FOR SOLUTION INTRAVENOUS at 01:53

## 2024-12-31 RX ADMIN — TROSPIUM CHLORIDE 20 MG: 20 TABLET, FILM COATED ORAL at 10:09

## 2024-12-31 RX ADMIN — TAMSULOSIN HYDROCHLORIDE 0.4 MG: 0.4 CAPSULE ORAL at 10:09

## 2024-12-31 RX ADMIN — OXYCODONE HYDROCHLORIDE AND ACETAMINOPHEN 1 TABLET: 5; 325 TABLET ORAL at 21:40

## 2024-12-31 RX ADMIN — SODIUM CHLORIDE, PRESERVATIVE FREE 10 ML: 5 INJECTION INTRAVENOUS at 10:16

## 2024-12-31 RX ADMIN — PIPERACILLIN AND TAZOBACTAM 3375 MG: 3; .375 INJECTION, POWDER, LYOPHILIZED, FOR SOLUTION INTRAVENOUS at 17:39

## 2024-12-31 RX ADMIN — OXYCODONE HYDROCHLORIDE AND ACETAMINOPHEN 1 TABLET: 5; 325 TABLET ORAL at 05:28

## 2024-12-31 RX ADMIN — SODIUM CHLORIDE, PRESERVATIVE FREE 10 ML: 5 INJECTION INTRAVENOUS at 10:11

## 2024-12-31 RX ADMIN — PIPERACILLIN AND TAZOBACTAM 3375 MG: 3; .375 INJECTION, POWDER, LYOPHILIZED, FOR SOLUTION INTRAVENOUS at 10:18

## 2024-12-31 RX ADMIN — SODIUM CHLORIDE, PRESERVATIVE FREE 5 ML: 5 INJECTION INTRAVENOUS at 21:39

## 2024-12-31 RX ADMIN — LATANOPROST 1 DROP: 50 SOLUTION OPHTHALMIC at 21:40

## 2024-12-31 RX ADMIN — AMLODIPINE BESYLATE 10 MG: 5 TABLET ORAL at 17:29

## 2024-12-31 RX ADMIN — OXYCODONE HYDROCHLORIDE AND ACETAMINOPHEN 1 TABLET: 5; 325 TABLET ORAL at 17:35

## 2024-12-31 RX ADMIN — HYDROMORPHONE HYDROCHLORIDE 0.5 MG: 1 INJECTION, SOLUTION INTRAMUSCULAR; INTRAVENOUS; SUBCUTANEOUS at 03:44

## 2024-12-31 ASSESSMENT — PAIN DESCRIPTION - DESCRIPTORS
DESCRIPTORS: ACHING
DESCRIPTORS: ACHING
DESCRIPTORS: ACHING;DISCOMFORT
DESCRIPTORS: ACHING

## 2024-12-31 ASSESSMENT — PAIN DESCRIPTION - ORIENTATION
ORIENTATION: RIGHT

## 2024-12-31 ASSESSMENT — PAIN DESCRIPTION - LOCATION
LOCATION: BACK
LOCATION: ABDOMEN;BACK
LOCATION: FLANK
LOCATION: BACK

## 2024-12-31 ASSESSMENT — PAIN SCALES - GENERAL
PAINLEVEL_OUTOF10: 4
PAINLEVEL_OUTOF10: 10
PAINLEVEL_OUTOF10: 2
PAINLEVEL_OUTOF10: 4
PAINLEVEL_OUTOF10: 8

## 2024-12-31 NOTE — PLAN OF CARE
Problem: Physical Therapy - Adult  Goal: By Discharge: Performs mobility at highest level of function for planned discharge setting.  See evaluation for individualized goals.  Description: FUNCTIONAL STATUS PRIOR TO ADMISSION: Patient was independent and active without use of DME.    HOME SUPPORT PRIOR TO ADMISSION: The patient lived with spouse and did not require assistance.    Physical Therapy Goals  Initiated 12/31/2024  1.  Patient will move from supine to sit and sit to supine in bed with modified independence within 7 day(s).    2.  Patient will perform sit to stand with independence within 7 day(s).  3.  Patient will transfer from bed to chair and chair to bed with independence using the least restrictive device within 7 day(s).  4.  Patient will ambulate with modified independence for 120 feet with the least restrictive device within 7 day(s).   5.  Patient will ascend/descend 6 stairs with 1 handrail(s) with contact guard assistance within 7 day(s).    Outcome: Progressing   PHYSICAL THERAPY EVALUATION    Patient: Gene Epperson (74 y.o. male)  Date: 12/31/2024  Primary Diagnosis: Hydroureter [N13.4]  Kidney stone [N20.0]  Ureteral stone [N20.1]  ANA (acute kidney injury) (HCC) [N17.9]  Hydronephrosis, unspecified hydronephrosis type [N13.30]  Sepsis (HCC) [A41.9]  Procedure(s) (LRB):  CYSTOSCOPY (Right) 5 Days Post-Op   Precautions: Restrictions/Precautions: Fall Risk                      ASSESSMENT :   DEFICITS/IMPAIRMENTS:   The patient is limited by decreased functional mobility, strength, endurance, increased pain levels S/P admission for sepsis, uretal stone, and hydronephrosis. Patient underwent R nephrostomy tube placement 12/27/24. He is received supine in bed and reports his pain is currently controlled. Patient is able to transition supine to sit with HOB elevated and use of bed rails. He demonstrates good sitting balance. He is able to ambulate increased distance with 1 HHA. Patient looks

## 2025-01-01 VITALS
HEART RATE: 88 BPM | BODY MASS INDEX: 27.2 KG/M2 | OXYGEN SATURATION: 96 % | SYSTOLIC BLOOD PRESSURE: 153 MMHG | DIASTOLIC BLOOD PRESSURE: 78 MMHG | WEIGHT: 190 LBS | HEIGHT: 70 IN | RESPIRATION RATE: 18 BRPM | TEMPERATURE: 97.5 F

## 2025-01-01 LAB
ANION GAP SERPL CALC-SCNC: 9 MMOL/L (ref 2–12)
BACTERIA SPEC CULT: NORMAL
BACTERIA SPEC CULT: NORMAL
BASOPHILS # BLD: 0 K/UL (ref 0–0.1)
BASOPHILS NFR BLD: 0 % (ref 0–1)
BUN SERPL-MCNC: 15 MG/DL (ref 6–20)
BUN/CREAT SERPL: 11 (ref 12–20)
CALCIUM SERPL-MCNC: 8.9 MG/DL (ref 8.5–10.1)
CHLORIDE SERPL-SCNC: 109 MMOL/L (ref 97–108)
CO2 SERPL-SCNC: 24 MMOL/L (ref 21–32)
CREAT SERPL-MCNC: 1.39 MG/DL (ref 0.7–1.3)
DIFFERENTIAL METHOD BLD: ABNORMAL
EOSINOPHIL # BLD: 0.4 K/UL (ref 0–0.4)
EOSINOPHIL NFR BLD: 6 % (ref 0–7)
ERYTHROCYTE [DISTWIDTH] IN BLOOD BY AUTOMATED COUNT: 14.2 % (ref 11.5–14.5)
GLUCOSE SERPL-MCNC: 97 MG/DL (ref 65–100)
HCT VFR BLD AUTO: 31.2 % (ref 36.6–50.3)
HGB BLD-MCNC: 10.6 G/DL (ref 12.1–17)
IMM GRANULOCYTES # BLD AUTO: 0.1 K/UL (ref 0–0.04)
IMM GRANULOCYTES NFR BLD AUTO: 1 % (ref 0–0.5)
LYMPHOCYTES # BLD: 1.3 K/UL (ref 0.8–3.5)
LYMPHOCYTES NFR BLD: 16 % (ref 12–49)
MCH RBC QN AUTO: 30 PG (ref 26–34)
MCHC RBC AUTO-ENTMCNC: 34 G/DL (ref 30–36.5)
MCV RBC AUTO: 88.4 FL (ref 80–99)
MONOCYTES # BLD: 0.6 K/UL (ref 0–1)
MONOCYTES NFR BLD: 8 % (ref 5–13)
NEUTS SEG # BLD: 5.4 K/UL (ref 1.8–8)
NEUTS SEG NFR BLD: 69 % (ref 32–75)
NRBC # BLD: 0 K/UL (ref 0–0.01)
NRBC BLD-RTO: 0 PER 100 WBC
PLATELET # BLD AUTO: 242 K/UL (ref 150–400)
PMV BLD AUTO: 9.7 FL (ref 8.9–12.9)
POTASSIUM SERPL-SCNC: 3.1 MMOL/L (ref 3.5–5.1)
RBC # BLD AUTO: 3.53 M/UL (ref 4.1–5.7)
SERVICE CMNT-IMP: NORMAL
SERVICE CMNT-IMP: NORMAL
SODIUM SERPL-SCNC: 142 MMOL/L (ref 136–145)
WBC # BLD AUTO: 7.7 K/UL (ref 4.1–11.1)

## 2025-01-01 PROCEDURE — 6370000000 HC RX 637 (ALT 250 FOR IP): Performed by: INTERNAL MEDICINE

## 2025-01-01 PROCEDURE — 85025 COMPLETE CBC W/AUTO DIFF WBC: CPT

## 2025-01-01 PROCEDURE — 94761 N-INVAS EAR/PLS OXIMETRY MLT: CPT

## 2025-01-01 PROCEDURE — 2580000003 HC RX 258: Performed by: INTERNAL MEDICINE

## 2025-01-01 PROCEDURE — 6370000000 HC RX 637 (ALT 250 FOR IP): Performed by: FAMILY MEDICINE

## 2025-01-01 PROCEDURE — 80048 BASIC METABOLIC PNL TOTAL CA: CPT

## 2025-01-01 PROCEDURE — 6370000000 HC RX 637 (ALT 250 FOR IP): Performed by: NURSE PRACTITIONER

## 2025-01-01 PROCEDURE — 6360000002 HC RX W HCPCS: Performed by: INTERNAL MEDICINE

## 2025-01-01 PROCEDURE — 36415 COLL VENOUS BLD VENIPUNCTURE: CPT

## 2025-01-01 RX ORDER — ONDANSETRON 4 MG/1
4 TABLET, ORALLY DISINTEGRATING ORAL EVERY 8 HOURS PRN
Qty: 30 TABLET | Refills: 0 | Status: SHIPPED | OUTPATIENT
Start: 2025-01-01 | End: 2025-01-11

## 2025-01-01 RX ORDER — TAMSULOSIN HYDROCHLORIDE 0.4 MG/1
0.4 CAPSULE ORAL DAILY
Qty: 30 CAPSULE | Refills: 3 | Status: SHIPPED | OUTPATIENT
Start: 2025-01-02

## 2025-01-01 RX ORDER — OXYCODONE AND ACETAMINOPHEN 5; 325 MG/1; MG/1
1 TABLET ORAL EVERY 4 HOURS PRN
Qty: 30 TABLET | Refills: 0 | Status: SHIPPED | OUTPATIENT
Start: 2025-01-01 | End: 2025-01-06

## 2025-01-01 RX ORDER — OXYBUTYNIN CHLORIDE 5 MG/1
5 TABLET ORAL 2 TIMES DAILY
Qty: 20 TABLET | Refills: 0 | Status: SHIPPED | OUTPATIENT
Start: 2025-01-01 | End: 2025-01-01

## 2025-01-01 RX ORDER — OXYBUTYNIN CHLORIDE 5 MG/1
5 TABLET ORAL 2 TIMES DAILY
Qty: 20 TABLET | Refills: 0 | Status: SHIPPED | OUTPATIENT
Start: 2025-01-01 | End: 2025-01-11

## 2025-01-01 RX ORDER — ONDANSETRON 4 MG/1
4 TABLET, ORALLY DISINTEGRATING ORAL EVERY 8 HOURS PRN
Qty: 30 TABLET | Refills: 0 | Status: SHIPPED | OUTPATIENT
Start: 2025-01-01 | End: 2025-01-01

## 2025-01-01 RX ORDER — OXYCODONE AND ACETAMINOPHEN 5; 325 MG/1; MG/1
1 TABLET ORAL EVERY 4 HOURS PRN
Qty: 30 TABLET | Refills: 0 | Status: SHIPPED | OUTPATIENT
Start: 2025-01-01 | End: 2025-01-01

## 2025-01-01 RX ADMIN — TAMSULOSIN HYDROCHLORIDE 0.4 MG: 0.4 CAPSULE ORAL at 09:52

## 2025-01-01 RX ADMIN — PIPERACILLIN AND TAZOBACTAM 3375 MG: 3; .375 INJECTION, POWDER, LYOPHILIZED, FOR SOLUTION INTRAVENOUS at 01:57

## 2025-01-01 RX ADMIN — TROSPIUM CHLORIDE 20 MG: 20 TABLET, FILM COATED ORAL at 09:52

## 2025-01-01 RX ADMIN — OXYCODONE HYDROCHLORIDE AND ACETAMINOPHEN 1 TABLET: 5; 325 TABLET ORAL at 01:45

## 2025-01-01 RX ADMIN — OXYCODONE HYDROCHLORIDE AND ACETAMINOPHEN 1 TABLET: 5; 325 TABLET ORAL at 09:52

## 2025-01-01 ASSESSMENT — PAIN DESCRIPTION - LOCATION
LOCATION: FLANK
LOCATION: FLANK

## 2025-01-01 ASSESSMENT — PAIN - FUNCTIONAL ASSESSMENT: PAIN_FUNCTIONAL_ASSESSMENT: ACTIVITIES ARE NOT PREVENTED

## 2025-01-01 ASSESSMENT — PAIN DESCRIPTION - DESCRIPTORS
DESCRIPTORS: ACHING;DISCOMFORT
DESCRIPTORS: ACHING

## 2025-01-01 ASSESSMENT — PAIN SCALES - GENERAL
PAINLEVEL_OUTOF10: 4
PAINLEVEL_OUTOF10: 4
PAINLEVEL_OUTOF10: 2

## 2025-01-01 ASSESSMENT — PAIN DESCRIPTION - ORIENTATION
ORIENTATION: RIGHT
ORIENTATION: RIGHT

## 2025-01-01 NOTE — PROGRESS NOTES
Urology Progress Note    Patient: Gene Epperson MRN: [id number]  SSN: [id number]    YOB: 1950  Age: 74 y.o.  Sex: male        Assessment:     Resting in bed, NAD   Reports worsening R flank pain, RLQ abd pain - overall feeling worse. Abx broadened today     Temp 100   UCX negative   Cr continues to improve   Resolution of leukocytosis     Repeat CT abd/pelv WO yesterday due to worsening right flank pain:   IMPRESSION:  Right nephrostomy tube appears to be positioned in the right renal pelvis  however this is limited by the several parapelvic cysts and the lack of  contrast.  Similarly, the degree of hydronephrosis on the right is difficult to  distinguish.  4 mm distal right ureteral stone.       Plan:     5 mm calculus in the mid ureter on the right.  Severe right-sided hydroureteronephrosis  numerous bilateral renal cysts.  additional right renal calculi.   There is a punctate calculus in the left kidney.  Hx of CIC  -Unable to identify the ureteral orifices on 12/26 for ureteral stent placement -> s/p right PCN placement by IR 12/27.  There was difficulty with placement due to parapelvic cysts.  400 mL urine documented from right PCN since placement 3 days ago.  Given low urine output from PCN, worsening right flank pain, and IRs procedure note mentioning difficult placement of PCN -> will reconsult interventional radiology to ensure PCN is adequately positioned.  Consideration of nephrostogram and repositioning.  -UCX negative. Abx broadened by primary team today given temp 100. Leukocytosis resolved.   -Continue CIC as needed     Discussed with Dr. Khan     ROS:     Reports worsening right flank pain, RLQ abdominal pain     Objective:     /74   Pulse 96   Temp 99.3 °F (37.4 °C) (Oral)   Resp 20   Ht 1.778 m (5' 10\")   Wt 88.5 kg (195 lb)   SpO2 94%   BMI 27.98 kg/m²     No intake or output data in the 24 hours ending 12/30/24 
                                          Progress Note    Patient: Gene Epperson MRN: [id number]  SSN: [id number]    YOB: 1950  Age: 74 y.o.  Sex: male          ADMITTED:  12/25/2024 to Roque Urena MD  for Hydroureter [N13.4]  Kidney stone [N20.0]  Ureteral stone [N20.1]  ANA (acute kidney injury) (HCC) [N17.9]  Hydronephrosis, unspecified hydronephrosis type [N13.30]           Gene Epperson was admitted for Hydroureter [N13.4]  Kidney stone [N20.0]  Ureteral stone [N20.1]  ANA (acute kidney injury) (HCC) [N17.9]  Hydronephrosis, unspecified hydronephrosis type [N13.30].    74 y.o. male with h/o capacious bladder performing CIC daily and h/o kidney stones. Followed by Dr. Valle, spends [place] in [place] and up visiting for 3 days. Began having low back pain, came to ER. Associated with 5mm mid right ureteral calculus and ANA Cr~3, lytes oK. Severity mild. Timing constant, duration hours. ER staff unable to place martin but patient performed CIC in ER w/o difficulty     Temp 99.0, VSS. Cr: 3.06 from 2.93. WBC: 12.7 from 8.9. Hgb: 12.0. UA: >100 RBCs, 5-10 WBCs.     12/26/24  CT AP wo:  KIDNEYS/URETERS: Severe right hydronephrosis. Bilateral cysts. 6 mm calculus in  the mid right ureter at the L4-L5 level is visible on the  view, unchanged.  URINARY BLADDER: Mural thickening of the anterior dome is new. Dependent  calculus is unchanged.     IMPRESSION:     1. Unchanged 6 mm obstructing mid right ureteral calculus is visible on the   view. Its progress can be followed with KUB if clinically indicated.  Unchanged severe right hydronephrosis.  2. Increased bibasilar atelectasis.  3. New mural thickening of the dome of the urinary bladder represents  inflammation or infection.    12/25/24  CT AP wo:  IMPRESSION:  There is mild right-sided hydroureteronephrosis with numerous bilateral renal  cysts. There is a 5 mm calculus in the mid ureter on the right. There are  additional right 
                                          Progress Note    Patient: Gene Epperson MRN: [id number]  SSN: [id number]    YOB: 1950  Age: 74 y.o.  Sex: male          ADMITTED:  12/25/2024 to Roque Duarte MD  for Hydroureter [N13.4]  Kidney stone [N20.0]  Ureteral stone [N20.1]  ANA (acute kidney injury) (HCC) [N17.9]  Hydronephrosis, unspecified hydronephrosis type [N13.30]  Sepsis (HCC) [A41.9]           Gene Epperson was admitted for Hydroureter [N13.4]  Kidney stone [N20.0]  Ureteral stone [N20.1]  ANA (acute kidney injury) (HCC) [N17.9]  Hydronephrosis, unspecified hydronephrosis type [N13.30]  Sepsis (HCC) [A41.9].    74 y.o. male with h/o capacious bladder performing CIC daily and h/o kidney stones. Followed by Dr. Valle [facility] in [place] and up visiting for 3 days. Began having low back pain, came to ER. Associated with 5mm mid right ureteral calculus and ANA Cr~3, lytes oK. Severity mild. Timing constant, duration hours. ER staff unable to place martin but patient performed CIC in ER w/o difficulty     Afebrile, VSS. Cr: 2.89 from 3.06. WBC: 15.0 from 12.7. Bcx: ngtd. Hgb: 11.3 from 12.0. UA: >100 RBCs, 5-10 WBCs. Ucx: pending. +rocephin. On assessment patient reports R flank pain last evening. Denies nausea/vomiting or fevers/chills at this time.    Pod1 CYSTOSCOPY per op note, scarring at bladder neck, high riding BN, unable to find UOs    12/26/24  CT AP wo:  KIDNEYS/URETERS: Severe right hydronephrosis. Bilateral cysts. 6 mm calculus in  the mid right ureter at the L4-L5 level is visible on the  view, unchanged.  URINARY BLADDER: Mural thickening of the anterior dome is new. Dependent  calculus is unchanged.     IMPRESSION:     1. Unchanged 6 mm obstructing mid right ureteral calculus is visible on the   view. Its progress can be followed with KUB if clinically indicated.  Unchanged severe right hydronephrosis.  2. Increased bibasilar atelectasis.  3. New mural 
                                          Progress Note    Patient: Gene Epperson MRN: [id number]  SSN: [id number]    YOB: 1950  Age: 74 y.o.  Sex: male          ADMITTED:  2024 to Roque Duarte MD  for Hydroureter [N13.4]  Kidney stone [N20.0]  Ureteral stone [N20.1]  ANA (acute kidney injury) (HCC) [N17.9]  Hydronephrosis, unspecified hydronephrosis type [N13.30]  Sepsis (HCC) [A41.9]           Gene Epperson was admitted for Hydroureter [N13.4]  Kidney stone [N20.0]  Ureteral stone [N20.1]  ANA (acute kidney injury) (HCC) [N17.9]  Hydronephrosis, unspecified hydronephrosis type [N13.30]  Sepsis (HCC) [A41.9].    74 y.o. male with h/o capacious bladder performing CIC daily and h/o kidney stones. Followed by Dr. Valle, [facility] in [place] and up visiting for 3 days.     Doing better after PCN placement by IR, Cr improved    Vitals:  Temp (24hrs), Av.2 °F (37.3 °C), Min:98.4 °F (36.9 °C), Max:99.7 °F (37.6 °C)     Blood pressure 139/76, pulse 91, temperature 99 °F (37.2 °C), temperature source Oral, resp. rate 18, height 1.778 m (5' 10\"), weight 86.2 kg (190 lb), SpO2 94%.      I&O's:  1901 -  07  In: -   Out: 1725 [Urine:1725]    0701 -  190  In: -   Out: 750 [Urine:750]         PE:    A&O  Sym chest rise  R neph tube with bloody output  Labs:   Recent Labs     24  0617 24  0612 24  0814   WBC 12.7* 15.0* 15.9*   HGB 12.0* 11.3* 10.9*   HCT 35.9* 34.2* 32.4*    167 144*     Recent Labs     24  0617 24  0612 24  0814    136 136   K 3.9 4.1 4.2   * 110* 108   CO2 22 20* 22   BUN 35* 34* 30*            Assessment:     - Principal Problem:    Ureteral stone  Active Problems:    Sepsis (HCC)  Resolved Problems:    * No resolved hospital problems. *      Plan:     5 mm calculus in the mid ureter on the right.  Severe right-sided hydroureteronephrosis  numerous bilateral renal cysts.  additional right 
12:15 PM  TRANSFER - IN REPORT:    Verbal report received from STEPHANIE Campbell on Gene Epperson  being received from [facility]io lab for routine post-op      Report consisted of patient's Situation, Background, Assessment and   Recommendations(SBAR).     Information from the following report(s) Nurse Handoff Report was reviewed with the receiving nurse.    Opportunity for questions and clarification was provided.      Assessment completed upon patient's arrival to unit and care assumed.     12:20 PM  TRANSFER - OUT REPORT:    Verbal report given to STEPHANIE Taylor on Gene Epperson  being transferred to [facility] for routine progression of patient care       Report consisted of patient's Situation, Background, Assessment and   Recommendations(SBAR).     Information from the following report(s) Nurse Handoff Report was reviewed with the receiving nurse.           Lines:   Peripheral IV 12/25/24 Left Antecubital (Active)   Site Assessment Clean, dry & intact 12/27/24 0800   Line Status Infusing 12/27/24 0800   Line Care Connections checked and tightened 12/27/24 0800   Phlebitis Assessment No symptoms 12/27/24 0800   Infiltration Assessment 0 12/27/24 0800   Alcohol Cap Used Yes 12/27/24 0800   Dressing Status Clean, dry & intact 12/27/24 0800   Dressing Type Transparent 12/27/24 0800        Opportunity for questions and clarification was provided.      Patient transported with:  Registered Nurse          
DARCI HERNANDEZ [facility]  [address] [place] [address] [phone]        Hospitalist Progress Note      NAME: Gene Epperson   :  1950  MRM:  [id number]    Date/Time of service: 2024  1:15 PM       Subjective:     Had another rough night with significant pain. Frustrated that it is ongoing       Objective:       Vitals:       Last 24hrs VS reviewed since prior progress note. Most recent are:    Vitals:    24 1150   BP: (!) 148/78   Pulse: 93   Resp: 18   Temp: 98.6 °F (37 °C)   SpO2: 96%     SpO2 Readings from Last 6 Encounters:   24 96%   24 94%   10/30/23 96%   23 98%          Intake/Output Summary (Last 24 hours) at 2024 1315  Last data filed at 2024 1054  Gross per 24 hour   Intake --   Output 1825 ml   Net -1825 ml        Exam:     Physical Exam:    Gen:  pale, less ill appearing, but still fatigued   HEENT:  Pink conjunctivae, hearing intact to voice, moist mucous membranes  Neck:  Supple  Resp:  No accessory muscle use, clear breath sounds without wheezes rales or rhonchi  Card:  No murmurs, normal S1, S2 without thrills, bruits or peripheral edema  Abd:  Soft, non-tender, non-distended  Skin:  No rashes or ulcers, skin turgor is good, perc neph drain somewhat sanguinous  Neuro:  Cranial nerves 3-12 are grossly intact, follows commands appropriately  Psych:  Good insight, oriented to person, place and time, alert      Medications Reviewed: (see below)    Lab Data Reviewed: (see below)    ______________________________________________________________________    Medications:     Current Facility-Administered Medications   Medication Dose Route Frequency    trospium (SANCTURA) tablet 20 mg  20 mg Oral Daily    oxyCODONE-acetaminophen (PERCOCET) 5-325 MG per tablet 1 tablet  1 tablet Oral Q4H PRN    amLODIPine (NORVASC) tablet 10 mg  10 mg Oral Daily    piperacillin-tazobactam (ZOSYN) 3,375 mg in sodium chloride 0.9 % 50 mL IVPB 
DARCI HERNANDEZ [facility]  [address] [place] [address] [phone]        Hospitalist Progress Note      NAME: Gene Epperson   :  1950  MRM:  [id number]    Date/Time of service: 2024  2:58 PM       Subjective:     Chief Complaint:  Patient was personally seen and examined by me during this time period.  Chart reviewed.  Patient reports that pain went away around 3am.  Does not know if he passed a stone or not. Per nursing report patient took po cipro from home.        Objective:       Vitals:       Last 24hrs VS reviewed since prior progress note. Most recent are:    Vitals:    24 1448   BP: (!) 147/71   Pulse: 99   Resp: 14   Temp: (!) 100.6 °F (38.1 °C)   SpO2: 93%     SpO2 Readings from Last 6 Encounters:   24 93%   24 94%   10/30/23 96%   23 98%          Intake/Output Summary (Last 24 hours) at 2024 1458  Last data filed at 2024 0347  Gross per 24 hour   Intake --   Output 750 ml   Net -750 ml        Exam:     Physical Exam:    Gen:  Well-developed, well-nourished, in no acute distress  HEENT:  Pink conjunctivae, hearing intact to voice, moist mucous membranes  Neck:  Supple  Resp:  No accessory muscle use, clear breath sounds without wheezes rales or rhonchi  Card:  No murmurs, normal S1, S2 without thrills, bruits or peripheral edema  Abd:  Soft, non-tender, non-distended  Skin:  No rashes or ulcers, skin turgor is good  Neuro:  Cranial nerves 3-12 are grossly intact, follows commands appropriately  Psych:  Good insight, oriented to person, place and time, alert      Medications Reviewed: (see below)    Lab Data Reviewed: (see below)    ______________________________________________________________________    Medications:     Current Facility-Administered Medications   Medication Dose Route Frequency    cefTRIAXone (ROCEPHIN) 1,000 mg in sterile water 10 mL IV syringe  1,000 mg IntraVENous Q24H    tamsulosin (FLOMAX) capsule 
DARCI HERNANDEZ [facility]  [address] [place] [address] [phone]        Hospitalist Progress Note      NAME: Gene Epperson   :  1950  MRM:  [id number]    Date/Time of service: 2024  4:24 PM       Subjective:     No acute events overnight. Pain is about the same as it was before the procedure. This afternoon he is febrile and tachycardic again. He and wife have decided to stay in [place] to treat this definitively rather than go back to [place].        Objective:       Vitals:       Last 24hrs VS reviewed since prior progress note. Most recent are:    Vitals:    24 1621   BP: 128/71   Pulse: 94   Resp: 17   Temp:    SpO2: 93%     SpO2 Readings from Last 6 Encounters:   24 93%   24 94%   10/30/23 96%   23 98%          Intake/Output Summary (Last 24 hours) at 2024 1624  Last data filed at 2024 1001  Gross per 24 hour   Intake --   Output 1500 ml   Net -1500 ml        Exam:     Physical Exam:    Gen:  Well-developed, well-nourished, in no acute distress  HEENT:  Pink conjunctivae, hearing intact to voice, dry mucous membranes  Neck:  Supple  Resp:  No accessory muscle use, clear breath sounds without wheezes rales or rhonchi  Card:  No murmurs, normal S1, S2 without thrills, bruits or peripheral edema  Abd:  Soft, non-tender, non-distended  Skin:  No rashes or ulcers, skin turgor is good, perc neph drain somewhat sanguinous  Neuro:  Cranial nerves 3-12 are grossly intact, follows commands appropriately  Psych:  Good insight, oriented to person, place and time, alert      Medications Reviewed: (see below)    Lab Data Reviewed: (see below)    ______________________________________________________________________    Medications:     Current Facility-Administered Medications   Medication Dose Route Frequency    amLODIPine (NORVASC) tablet 10 mg  10 mg Oral Daily    piperacillin-tazobactam (ZOSYN) 3,375 mg in sodium chloride 0.9 % 50 mL 
[facility]    Trospium was automatically dose-adjusted per [facility] P&T Committee Protocol, with respect to patient's age.     Assessment/Plan: Estimated Creatinine Clearance: 41 mL/min (A) (based on SCr of 1.58 mg/dL (H)). Patient is 74 years old. Trospium changed to 20 mg daily per P&T approved protocol for patient with age greater than or equal to 75 years old.    Creatinine Clearance Estimated Creatinine Clearance: 41 mL/min (A) (based on SCr of 1.58 mg/dL (H)).   BUN Lab Results   Component Value Date/Time    BUN 22 12/31/2024 02:08 AM         Pharmacist JER NEGRETE [facility]    
Notified MD of results from Vascular US Duplex. No new orders given at this time. IV moved from left arm to right arm.  
Rapid Called at 1613    Responded to RRT at 1613 for CODE SEPSISHeart Rate > 90 and WBC > 12K/mcL,N/A    Provider at bedside: YES  Interventions ordered: Code Sepsis Protocol  Sepsis Suspected: Yes  Transfer to Higher Level of Care: No  Blood Glucose: N/A    Situation  Patient febrile with increased lethargy. Vitals WNL. Patient tired but no apparent signs of distress.     Background  Patient admitted for hydronephrosis and renal calculi.     Assessment  Patient is a 74 year old male presenting with a increased lethargy and temp (100.4F). Patient HR slightly elevated at 94. RR WNL. BP WNL. Patient alert and oriented x4, in NAD. Dr. Duarte at bedside.     Intervention/Recommendation  Early screening and trigger for code sepsis due to temp spike, elevated HR, and increased fatigue. Dr. Duarte ordered ABX and a POC lactic to determine course of treatment. Lactic- 0.92. RRT RN assisted primary with assessment and obtaining POC.     Reevaluation      Vitals:    12/28/24 1621   BP: 128/71   Pulse: 94   Resp: 17   Temp:    SpO2: 93%        Rapid Ended at 1640  RRT RN assisted with transport to accepting unit HANNAH Boyer RN    
Rapid Response called at 1531    Responded to rapid response at 1531 for Code Sepsis  Patient in pre op for ureteral stent placement- possible infected stone.   SIRS: HR> 100   Temp> 100.4  POC lactic negative  Cr. 3.06 although this is from 0600 this morning.     Provider already ordered paired BC and were drawn prior to being at the bedside and sent to lab. Patient alert/oriented. No complaints. No further interventions at this time. Going into surgery shortly.     Provider at bedside: No  Interventions ordered: POC lactic/ paired BC  Sepsis suspected: No  Transfer to higher level of care: No    Rapid ended at 1545    RRT RN assisted with transport to accepting unit: N/A   
SUBJECTIVE:   No acute overnight events, pain much improved  ------------------------------------------------------------------------------------------  OBJECTIVE:  VS: /74   Pulse 84   Temp 99 °F (37.2 °C) (Oral)   Resp 18   Ht 1.778 m (5' 10\")   Wt 86.2 kg (190 lb)   SpO2 94%   BMI 27.26 kg/m²     Physical Exam:     General: NAD, A+Ox3    Cards: good distal perfusion throughout    Pulm: Adequate lung expansion BL    ABD: non-tender, non-distended, no guarding or rebound    :   Right PCN draining clear/yellow clear/yellow urineurine    Neuro: Annual no deficits appreciable  ---------------------------------------------------------------------------------------------  ASSESSMENT/PLAN:   74-year-old gentleman, PMHx multiple comorbidities and currently admitted for 4mm obstructing proximal ureteral stone in context of uncontrolled pain / renal failure, now s/p unsuccessful attemped R-ureteral stent placement with Dr. Mar 12/26/24 secondary to inability to locate R-ureteral orifice. Now s/p R-PCN placement with IR 12/27/24, significant post-op discomfort despite PCN draining well, repeat imaging demonstrating PCN to be in appropriate position, and pain now well-controlled. Cr trending down to 1.5, pt afebrile and hemodynamically stable. PT recommending SNF placement, urology will follow up with patient in office for discussion re: definitive management of stone burden. Urology will sign off, please page should further questions or concerns arise.   
When self cathing patients urine noted to be bloody with insertion then became yellow.   
AM          
CREATININE 1.58 (H) 12/31/2024    BUN 22 (H) 12/31/2024     12/31/2024    K 3.6 12/31/2024     (H) 12/31/2024    CO2 24 12/31/2024       No results found for: \"POCGLU\"     No results found for: \"LABA1C\", \"EHF7GVIB\"    No results found for: \"MG\"    Lab Results   Component Value Date    CALCIUM 8.9 12/31/2024       No results found for: \"TRIG\"    Nutr. Meds:  Scheduled Meds:   trospium  20 mg Oral Daily    amLODIPine  10 mg Oral Daily    piperacillin-tazobactam  3,375 mg IntraVENous q8h    sodium chloride flush  5-40 mL IntraVENous 2 times per day    tamsulosin  0.4 mg Oral Daily    latanoprost  1 drop Right Eye Nightly    sodium chloride flush  5-40 mL IntraVENous 2 times per day     Continuous Infusions:   sodium chloride 5 mL/hr at 12/30/24 1014    sodium chloride 5 mL/hr at 12/30/24 1923     PRN Meds: oxyCODONE-acetaminophen, sodium chloride flush, sodium chloride, sodium chloride flush, sodium chloride, ondansetron **OR** ondansetron, polyethylene glycol, acetaminophen **OR** acetaminophen, HYDROmorphone, hydrALAZINE      Current Nutrition Intake & Therapies:    Average Meal Intake: 51-75% (per patient report)  Average Supplements Intake: None Ordered  ADULT DIET; Regular    Anthropometric Measures:  Height: 177.8 cm (5' 10\")  Ideal Body Weight (IBW): 166 lbs (75 kg)       Current Body Weight: 86.7 kg (191 lb 2.2 oz), 115.1 % IBW. Weight Source: Bed scale  Current BMI (kg/m2): 27.4  Usual Body Weight: 83.9 kg (185 lb)     % Weight Change (Calculated): 3.3  Weight Adjustment For: No Adjustment                 BMI Categories: Overweight (BMI 25.0-29.9)    Estimated Daily Nutrient Needs:  Energy Requirements Based On: Formula  Weight Used for Energy Requirements: Current  Energy (kcal/day): 2085 (MSJ x 1.3)  Weight Used for Protein Requirements: Current  Protein (g/day): 86 (1 g/kg)  Method Used for Fluid Requirements: 1 ml/kcal  Fluid (ml/day): 2085    Nutrition Diagnosis:   No nutrition diagnosis at 
injection 5-40 mL  5-40 mL IntraVENous 2 times per day    sodium chloride flush 0.9 % injection 5-40 mL  5-40 mL IntraVENous PRN    0.9 % sodium chloride infusion   IntraVENous PRN    tamsulosin (FLOMAX) capsule 0.4 mg  0.4 mg Oral Daily    latanoprost (XALATAN) 0.005 % ophthalmic solution 1 drop  1 drop Right Eye Nightly    sodium chloride flush 0.9 % injection 5-40 mL  5-40 mL IntraVENous 2 times per day    sodium chloride flush 0.9 % injection 5-40 mL  5-40 mL IntraVENous PRN    0.9 % sodium chloride infusion   IntraVENous PRN    ondansetron (ZOFRAN-ODT) disintegrating tablet 4 mg  4 mg Oral Q8H PRN    Or    ondansetron (ZOFRAN) injection 4 mg  4 mg IntraVENous Q6H PRN    polyethylene glycol (GLYCOLAX) packet 17 g  17 g Oral Daily PRN    acetaminophen (TYLENOL) tablet 650 mg  650 mg Oral Q6H PRN    Or    acetaminophen (TYLENOL) suppository 650 mg  650 mg Rectal Q6H PRN    HYDROmorphone (DILAUDID) injection 0.5 mg  0.5 mg IntraVENous Q4H PRN    0.9 % sodium chloride infusion   IntraVENous Continuous    hydrALAZINE (APRESOLINE) injection 5 mg  5 mg IntraVENous Q6H PRN          Lab Review:     Recent Labs     12/25/24  0614 12/26/24  0617 12/27/24  0612   WBC 8.9 12.7* 15.0*   HGB 13.0 12.0* 11.3*   HCT 38.8 35.9* 34.2*    164 167     Recent Labs     12/25/24  0614 12/26/24  0617 12/27/24  0612    137 136   K 3.4* 3.9 4.1    109* 110*   CO2 25 22 20*   BUN 35* 35* 34*   ALT 21 16 18     No results found for: \"GLUCPOC\"       Assessment / Plan:     Principal Problem:    Ureteral stone  Active Problems:    Sepsis (HCC)  Resolved Problems:    * No resolved hospital problems. *      Gene is a 74 y.o.  male with PMHx Capacious bladder, thoracic aortic aneurysm who presents with right flank pain for 2 to 3 days found to have an obstructing ureteral stone.     #Sepsis due to complicated UTI w/ obstructing nephrolithiasis/hydro:  6 mm in size. Repeat CT shows stone still present and possible infection. 
at home   - Broadened to abx to pip/tazo 12/28 given new fever   - Repeat CT today as perc neph placement was noted to be difficult and may need to be repositioned   - Urology has signed off but pt desires to follow up here with [facility] instead of going back to [place]. Will reconsult    - Cont tamsulosin   - Continue to monitor fluid responsiveness   - UOP and MAPs to goal    ANA: Acute, present on admission.  Prior cr 2 years ago was 1.26, but presented this admission with Cr 3.06. Urine lytes suggest pre-renal etiology. Improving   - Renally dose meds, avoid nephrotoxins   - Pt straight caths at home; monitor I/O     Hypertension: OK to resume home meds     Glaucoma: Chronic, controlled.  Continue latanoprost in right eye.     Thoracic aortic aneurysm: Chronic, control unknown.  No current symptoms.  As needed hydralazine for BPs greater than 180/110.              Care Plan discussed with: Patient    Discussed:  Care Plan    Prophylaxis:  SCDs    Disposition:  Home w/Family             ___________________________________________________    Attending Physician: Roque Duarte MD    
placement so underwent perc neph 12/27. Repeat CT again yesterday not very helpful as cysts complicate ability to  drain placement and hydronephrosis. WBC has improved, but with ongoing low grade temp, worsening pain, and clinical exam, I worry we do not have sufficient source control for abx alone to be effective.    - Broadened to abx to pip/tazo 12/28 given new fever   - No culture data to drive abx   - Will ask Urology to see again today     ANA: Acute, present on admission.  Prior cr 2 years ago was 1.26, but presented this admission with Cr 3.06. Urine lytes suggest pre-renal etiology. Improving to nearly resolved.    - Renally dose meds, avoid nephrotoxins   - Pt straight caths at home; monitor I/O     Hypertension: OK to resume home meds     Glaucoma: Chronic, controlled.  Continue latanoprost in right eye.     Thoracic aortic aneurysm: Chronic, control unknown.  No current symptoms.  As needed hydralazine for BPs greater than 180/110.              Care Plan discussed with: Patient    Discussed:  Care Plan    Prophylaxis:  SCDs    Disposition:  Home w/Family             ___________________________________________________    Attending Physician: Roque Duarte MD    
INDICATION: Right-sided hydronephrosis. 6 mm calculus in the mid ureter on the right.  FINDINGS: After detailed discussion of the procedure, risks, benefits, alternatives, and complications with the patient, informed written consent was obtained and placed on patient's medical record. The patient was then brought to the angiographic suite and placed prone on the angiographic table. Conscious sedation was obtained utilizing fentanyl and Versed. The right flank was prepped and draped in standard sterile fashion. Lidocaine 1% was used for local anesthetic. A 20-gauge needle was utilized to access a lower pole renal calyx utilizing ultrasonographic guidance. Several renal cysts in this patient with numerous peripelvic cysts were initially accessed. Contrast injection did not demonstrate passage of contrast material into the renal collecting system. Eventually the renal pelvis was opacified in a micropuncture wire and AccuStick sheath was advanced into the right ureter. Subsequently after serial dilatation and passage of a Belcher wire into the right ureter and urinary bladder confirmed with fluoroscopic imaging placement of a 8 Guatemalan nephrostomy catheter was performed. The proximal pigtail was not well formed in the renal pelvis, despite attempted repositioning. This is likely secondary to the fact that the peripelvic cyst present on the right compresses the true renal pelvis limiting its distention. Urine passage out of the catheter at the completion of the procedure.. Contrast injection demonstrated passage of contrast material into both the renal collecting system and into the right ureter. The drainage catheter was attached to a drainage bag. The catheter was sutured in using 2-0 silk suture. There were no immediate complications and patient tolerated the procedure well. : Vishal Dias MD. PROCEDURE: Ultrasound and fluoroscopic guided placement of right-sided 8 Guatemalan nephrostomy catheter.

## 2025-01-01 NOTE — DISCHARGE INSTRUCTIONS
obtained by :  I understand that if any problems occur once I am at home I am to contact my physician.    I understand and acknowledge receipt of the instructions indicated above.                                                                                                                                           Physician's or R.N.'s Signature                                                                  Date/Time                                                                                                                                              Patient or Representative Signature                                                          Date/Time

## 2025-01-01 NOTE — CARE COORDINATION
Care Management Progress Note    Reason for Admission:   Hydroureter [N13.4]  Kidney stone [N20.0]  Ureteral stone [N20.1]  ANA (acute kidney injury) (HCC) [N17.9]  Hydronephrosis, unspecified hydronephrosis type [N13.30]  Sepsis (HCC) [A41.9]  Procedure(s) (LRB):  CYSTOSCOPY (Right)  5 Days Post-Op    Patient Admission Status: Inpatient  RUR: 12% Low  Hospitalization in the last 30 days (Readmission):  No        Transition of care plan:  Medical management ongoing, urology following.  Patient recommended for SNF. CM met with patient and provided SNF list. He will discuss and review further with his wife. CM requested at least 5 choices to initiate referrals. No auth needed. CM updated wife who states that the patient will need to have kidney stone removed first. CM to follow.   Discharge plan communicated with patient and/or discharge caregiver: Yes    Date 1st IMM letter given: 12/27/24  Outpatient follow-up: NA  Transport at discharge: WANDA Albrecht, [place]  [phone]  
Update:    I discussed pt with attending.pt has progressed and no longer needs SNF or home health,    Jayna Silva RN  
REHAN RAINES 1950 [id number]                     PCP: Yinka Lee MD   Address: None   Phone number: None    Pharmacy:   [id number] [place], [place] - [address] - P [phone] - F [phone]  [address] [facility] [address]  Phone: [phone] Fax: [phone]    [place] Transport: (P) Family       Transition of care plan:    []Unable to determine at this time. Awaiting clinical progress, and disposition recommendations.    [x] Home. No assistance required.     [] Home. Pt refused recommended services.    [] Home with family assistance as needed, and outpatient follow-up.    [] Home with Outpatient PT and outpatient follow-up   Pt aware of OP appt? []Yes, Provider:   []Not scheduled   Transport provider:     [] Home with outpatient services.    Specify:    [] Home with Home Health   - [place] of Choice offered? [] Yes, Preference:   [] NA    []SNF/IPR   -[]Freedom of Choice offered, and preferences given:   []Listing provided and preferences requested   -Status: []Pending []Accepted:    -Auth required: []Yes []No    -Auth initiated date:   -3 midnight stay required: []Yes []No  Date satisfied:     [] LTC:     [] Home with Hospice   - [place] of Choice offered? [] Yes, Preference:   [] NA    [] [facility] information provided.     [] Other:       Lisa Albrecht  Case Management Department  For questions or concerns, please [employer]  [phone]

## 2025-01-01 NOTE — DISCHARGE SUMMARY
MG per tablet  tamsulosin 0.4 MG capsule        Activity: activity as tolerated  Diet: regular diet  Wound Care: none needed    Follow-up with Yinka Lee MD in 1 week.  Follow-up tests/labs   Stone retrieval     Approximate time spent in patient care on day of discharge: 40 min    Signed:  Roque Duarte MD  1/1/2025  10:34 AM

## 2025-01-05 ENCOUNTER — HOSPITAL ENCOUNTER (EMERGENCY)
Facility: HOSPITAL | Age: 75
Discharge: HOME | End: 2025-01-05
Attending: EMERGENCY MEDICINE
Payer: MEDICARE

## 2025-01-05 ENCOUNTER — APPOINTMENT (OUTPATIENT)
Facility: HOSPITAL | Age: 75
End: 2025-01-05
Payer: MEDICARE

## 2025-01-05 VITALS
TEMPERATURE: 97.4 F | HEIGHT: 70 IN | SYSTOLIC BLOOD PRESSURE: 154 MMHG | HEART RATE: 80 BPM | OXYGEN SATURATION: 100 % | BODY MASS INDEX: 27.2 KG/M2 | RESPIRATION RATE: 16 BRPM | WEIGHT: 190 LBS | DIASTOLIC BLOOD PRESSURE: 76 MMHG

## 2025-01-05 DIAGNOSIS — T83.092A OBSTRUCTED NEPHROSTOMY TUBE (HCC): Primary | ICD-10-CM

## 2025-01-05 PROCEDURE — 99284 EMERGENCY DEPT VISIT MOD MDM: CPT

## 2025-01-05 PROCEDURE — 74176 CT ABD & PELVIS W/O CONTRAST: CPT

## 2025-01-05 ASSESSMENT — PAIN - FUNCTIONAL ASSESSMENT: PAIN_FUNCTIONAL_ASSESSMENT: NONE - DENIES PAIN

## 2025-01-05 NOTE — ED PROVIDER NOTES
time of this note:    CT ABDOMEN PELVIS WO CONTRAST Additional Contrast? None   Final Result   Right nephrostomy tube appears to be positioned in the right renal collecting   system, likely right mid/upper pole calyx.         Evaluation is limited by the several parapelvic cysts and the lack of contrast.   Similarly, the degree of hydronephrosis on the right is diminished compared to   the December 25, 2024 examination, not significantly changed since the 1229   study.      Distal right ureteral stone 7 mm..         Electronically signed by MAYLIN BANKS           LABS:  Labs Reviewed - No data to display    All other labs were within normal range or not returned as of this dictation.    EMERGENCY DEPARTMENT COURSE and DIFFERENTIAL DIAGNOSIS/MDM:   Vitals:    Vitals:    01/05/25 1455   BP: (!) 154/76   Pulse: 80   Resp: 16   Temp: 97.4 °F (36.3 °C)   TempSrc: Oral   SpO2: 100%   Weight: 86.2 kg (190 lb)   Height: 1.778 m (5' 10\")           Medical Decision Making  Assessment: Evaluation of patient's urostomy tube showed that the stopcock valve was turned to the off position.  I adjusted this and opened the valve so that it would drain into the urostomy bag.  He started to have steady flow of dark-colored urine.  Vital signs normal.  CT scan to obtain placement.  Plan to follow-up with urology.    Amount and/or Complexity of Data Reviewed  Radiology: ordered.            REASSESSMENT            CONSULTS:  None    PROCEDURES:  Unless otherwise noted below, none     Procedures      FINAL IMPRESSION      1. Obstructed nephrostomy tube (HCC)          DISPOSITION/PLAN   DISPOSITION Decision To Discharge 01/05/2025 03:40:09 PM      PATIENT REFERRED TO:  [facility]  [facility]  [address] [address]  [phone]  Call       [facility] EMERGENCY DEPT  [address] [facility] [address] [phone]    If symptoms worsen      DISCHARGE MEDICATIONS:  New

## 2025-01-05 NOTE — ED TRIAGE NOTES
Patient is a 74 year old male complaining of needing to having his tube flushed. Patient reports about 6 hours ago his drain stopped producing. Tube on R side. Patient denies pain, fever, nausea/vomiting. Patient alert and oriented x4, in NAD.

## 2025-01-05 NOTE — ED NOTES
Pt was discharged by Dr Tonya Birmingham  Pt verbalized good understanding of all discharge instructions, and follow up care.   All questions answered.  Pt in stable condition on discharge.

## 2025-01-27 ENCOUNTER — TELEPHONE (OUTPATIENT)
Age: 75
End: 2025-01-27

## 2025-01-27 NOTE — TELEPHONE ENCOUNTER
Patient called about a cardiac clearance for upcoming urology procedure that he has scheduled on February 5, 2025 for, please follow up with patient at [phone]    Provider below doing procedure   Zachary Levy M.D.    However [facility] is asking for clearance   [address] [place] [address]

## 2025-01-28 NOTE — TELEPHONE ENCOUNTER
Faxed clearance note with last OV and echo. Informed pt he needs to come in for f/u with new MD and echo.    Please call pt to schedule for an MD appt and echo - was a Dr Schultz pt. Pt knows that appt will not happen prior to 2/5/25 surgery.

## 2025-01-28 NOTE — TELEPHONE ENCOUNTER
Madison Royal, APRN - NP  You17 hours ago (4:33 PM)       Does he plan to stay with the practice, if so he needs a FU appt with Echo as soon as we can.    Pt Elevated cardiac risk, may proceed with procedure.(Per Revised Cardiac Risk Index (Roosevelt Criteria) )  Not on anticoagulation    Madison Royal, APRN - NP

## 2025-02-04 ENCOUNTER — TELEPHONE (OUTPATIENT)
Age: 75
End: 2025-02-04

## 2025-02-04 NOTE — TELEPHONE ENCOUNTER
[facility] called to request that the patient's echo be pushed up to a sooner date so that it can be review and the patient can proceed with procedure.  Echo was scheduled in May with follow up, I moved the echo up to 2/20.          [facility]  # [phone]

## 2025-02-06 ENCOUNTER — ANCILLARY PROCEDURE (OUTPATIENT)
Age: 75
End: 2025-02-06
Payer: MEDICARE

## 2025-02-06 VITALS
HEART RATE: 80 BPM | BODY MASS INDEX: 27.2 KG/M2 | SYSTOLIC BLOOD PRESSURE: 138 MMHG | DIASTOLIC BLOOD PRESSURE: 70 MMHG | WEIGHT: 190 LBS | HEIGHT: 70 IN

## 2025-02-06 DIAGNOSIS — I71.21 ANEURYSM OF ASCENDING AORTA WITHOUT RUPTURE (HCC): ICD-10-CM

## 2025-02-06 DIAGNOSIS — I35.1 NONRHEUMATIC AORTIC VALVE INSUFFICIENCY: ICD-10-CM

## 2025-02-06 PROCEDURE — 93306 TTE W/DOPPLER COMPLETE: CPT | Performed by: INTERNAL MEDICINE

## 2025-02-07 ENCOUNTER — OFFICE VISIT (OUTPATIENT)
Age: 75
End: 2025-02-07
Payer: MEDICARE

## 2025-02-07 VITALS
BODY MASS INDEX: 27.23 KG/M2 | SYSTOLIC BLOOD PRESSURE: 152 MMHG | HEIGHT: 70 IN | WEIGHT: 190.2 LBS | DIASTOLIC BLOOD PRESSURE: 58 MMHG | HEART RATE: 98 BPM | OXYGEN SATURATION: 95 %

## 2025-02-07 DIAGNOSIS — I10 ESSENTIAL HYPERTENSION: ICD-10-CM

## 2025-02-07 DIAGNOSIS — I35.1 NONRHEUMATIC AORTIC VALVE INSUFFICIENCY: ICD-10-CM

## 2025-02-07 DIAGNOSIS — I71.21 ANEURYSM OF ASCENDING AORTA WITHOUT RUPTURE (HCC): Primary | ICD-10-CM

## 2025-02-07 DIAGNOSIS — I51.7 LVH (LEFT VENTRICULAR HYPERTROPHY): ICD-10-CM

## 2025-02-07 LAB
ECHO AO ASC DIAM: 5.1 CM
ECHO AO ASCENDING AORTA INDEX: 2.5 CM/M2
ECHO AO ROOT DIAM: 5 CM
ECHO AO ROOT INDEX: 2.45 CM/M2
ECHO AV AREA PEAK VELOCITY: 5.1 CM2
ECHO AV AREA VTI: 5.4 CM2
ECHO AV AREA/BSA PEAK VELOCITY: 2.5 CM2/M2
ECHO AV AREA/BSA VTI: 2.6 CM2/M2
ECHO AV MEAN GRADIENT: 3 MMHG
ECHO AV MEAN VELOCITY: 0.8 M/S
ECHO AV PEAK GRADIENT: 6 MMHG
ECHO AV PEAK VELOCITY: 1.2 M/S
ECHO AV VELOCITY RATIO: 1
ECHO AV VTI: 22 CM
ECHO BSA: 2.06 M2
ECHO LA DIAMETER INDEX: 2.21 CM/M2
ECHO LA DIAMETER: 4.5 CM
ECHO LA TO AORTIC ROOT RATIO: 0.9
ECHO LA VOL A-L A2C: 96 ML (ref 18–58)
ECHO LA VOL A-L A4C: 62 ML (ref 18–58)
ECHO LA VOL MOD A2C: 86 ML (ref 18–58)
ECHO LA VOL MOD A4C: 60 ML (ref 18–58)
ECHO LA VOLUME AREA LENGTH: 86 ML
ECHO LA VOLUME INDEX A-L A2C: 47 ML/M2 (ref 16–34)
ECHO LA VOLUME INDEX A-L A4C: 30 ML/M2 (ref 16–34)
ECHO LA VOLUME INDEX AREA LENGTH: 42 ML/M2 (ref 16–34)
ECHO LA VOLUME INDEX MOD A2C: 42 ML/M2 (ref 16–34)
ECHO LA VOLUME INDEX MOD A4C: 29 ML/M2 (ref 16–34)
ECHO LV E' LATERAL VELOCITY: 4.27 CM/S
ECHO LV E' SEPTAL VELOCITY: 3.68 CM/S
ECHO LV EDV A2C: 215 ML
ECHO LV EDV A4C: 224 ML
ECHO LV EDV BP: 219 ML (ref 67–155)
ECHO LV EDV INDEX A4C: 110 ML/M2
ECHO LV EDV INDEX BP: 107 ML/M2
ECHO LV EDV NDEX A2C: 105 ML/M2
ECHO LV EJECTION FRACTION A2C: 56 %
ECHO LV EJECTION FRACTION A4C: 47 %
ECHO LV EJECTION FRACTION BIPLANE: 52 % (ref 55–100)
ECHO LV ESV A2C: 95 ML
ECHO LV ESV A4C: 118 ML
ECHO LV ESV BP: 106 ML (ref 22–58)
ECHO LV ESV INDEX A2C: 47 ML/M2
ECHO LV ESV INDEX A4C: 58 ML/M2
ECHO LV ESV INDEX BP: 52 ML/M2
ECHO LV FRACTIONAL SHORTENING: 26 % (ref 28–44)
ECHO LV INTERNAL DIMENSION DIASTOLE INDEX: 2.45 CM/M2
ECHO LV INTERNAL DIMENSION DIASTOLIC: 5 CM (ref 4.2–5.9)
ECHO LV INTERNAL DIMENSION SYSTOLIC INDEX: 1.81 CM/M2
ECHO LV INTERNAL DIMENSION SYSTOLIC: 3.7 CM
ECHO LV IVSD: 1 CM (ref 0.6–1)
ECHO LV MASS 2D: 182 G (ref 88–224)
ECHO LV MASS INDEX 2D: 89.2 G/M2 (ref 49–115)
ECHO LV POSTERIOR WALL DIASTOLIC: 1 CM (ref 0.6–1)
ECHO LV RELATIVE WALL THICKNESS RATIO: 0.4
ECHO LVOT AREA: 5.3 CM2
ECHO LVOT AV VTI INDEX: 1.02
ECHO LVOT DIAM: 2.6 CM
ECHO LVOT MEAN GRADIENT: 3 MMHG
ECHO LVOT PEAK GRADIENT: 6 MMHG
ECHO LVOT PEAK VELOCITY: 1.2 M/S
ECHO LVOT STROKE VOLUME INDEX: 58.5 ML/M2
ECHO LVOT SV: 119.4 ML
ECHO LVOT VTI: 22.5 CM
ECHO MV A VELOCITY: 0.65 M/S
ECHO MV AREA PHT: 2.8 CM2
ECHO MV E DECELERATION TIME (DT): 273.9 MS
ECHO MV E VELOCITY: 0.38 M/S
ECHO MV E/A RATIO: 0.58
ECHO MV E/E' LATERAL: 8.9
ECHO MV E/E' RATIO (AVERAGED): 9.61
ECHO MV E/E' SEPTAL: 10.33
ECHO MV PRESSURE HALF TIME (PHT): 79.4 MS
ECHO RA AREA 4C: 20 CM2
ECHO RA END SYSTOLIC VOLUME APICAL 4 CHAMBER INDEX BSA: 35 ML/M2
ECHO RA VOLUME: 72 ML
ECHO RV INTERNAL DIMENSION: 4.6 CM
ECHO RV TAPSE: 2.2 CM (ref 1.7–?)
ECHO TV REGURGITANT MAX VELOCITY: 2.17 M/S
ECHO TV REGURGITANT PEAK GRADIENT: 19 MMHG

## 2025-02-07 PROCEDURE — G8427 DOCREV CUR MEDS BY ELIG CLIN: HCPCS | Performed by: INTERNAL MEDICINE

## 2025-02-07 PROCEDURE — 93010 ELECTROCARDIOGRAM REPORT: CPT | Performed by: INTERNAL MEDICINE

## 2025-02-07 PROCEDURE — 93005 ELECTROCARDIOGRAM TRACING: CPT | Performed by: INTERNAL MEDICINE

## 2025-02-07 PROCEDURE — 1123F ACP DISCUSS/DSCN MKR DOCD: CPT | Performed by: INTERNAL MEDICINE

## 2025-02-07 PROCEDURE — 1126F AMNT PAIN NOTED NONE PRSNT: CPT | Performed by: INTERNAL MEDICINE

## 2025-02-07 PROCEDURE — 99215 OFFICE O/P EST HI 40 MIN: CPT | Performed by: INTERNAL MEDICINE

## 2025-02-07 PROCEDURE — G8419 CALC BMI OUT NRM PARAM NOF/U: HCPCS | Performed by: INTERNAL MEDICINE

## 2025-02-07 PROCEDURE — 3077F SYST BP >= 140 MM HG: CPT | Performed by: INTERNAL MEDICINE

## 2025-02-07 PROCEDURE — 1159F MED LIST DOCD IN RCRD: CPT | Performed by: INTERNAL MEDICINE

## 2025-02-07 PROCEDURE — 3078F DIAST BP <80 MM HG: CPT | Performed by: INTERNAL MEDICINE

## 2025-02-07 PROCEDURE — 1036F TOBACCO NON-USER: CPT | Performed by: INTERNAL MEDICINE

## 2025-02-07 RX ORDER — METOPROLOL SUCCINATE 25 MG/1
25 TABLET, EXTENDED RELEASE ORAL DAILY
Qty: 90 TABLET | Refills: 3 | Status: SHIPPED | OUTPATIENT
Start: 2025-02-07

## 2025-02-07 RX ORDER — ALPRAZOLAM 0.5 MG
TABLET ORAL
COMMUNITY

## 2025-02-07 ASSESSMENT — PATIENT HEALTH QUESTIONNAIRE - PHQ9
SUM OF ALL RESPONSES TO PHQ QUESTIONS 1-9: 0
SUM OF ALL RESPONSES TO PHQ QUESTIONS 1-9: 0
1. LITTLE INTEREST OR PLEASURE IN DOING THINGS: NOT AT ALL
SUM OF ALL RESPONSES TO PHQ QUESTIONS 1-9: 0
2. FEELING DOWN, DEPRESSED OR HOPELESS: NOT AT ALL
SUM OF ALL RESPONSES TO PHQ9 QUESTIONS 1 & 2: 0
SUM OF ALL RESPONSES TO PHQ QUESTIONS 1-9: 0

## 2025-02-07 NOTE — PROGRESS NOTES
1. Have you been to the ER, urgent care clinic since your last visit?  Hospitalized since your last visit?Yes SF 12/25/24 8 day stay kidney stone    2. Have you seen or consulted any other health care providers outside of the [facility] since your last visit?  Include any pap smears or colon screening. No    
Per Dr. 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  Urgent referral placed and faxed to CT surgery office, copy provided to the patient with contact info, instructed to call today to schedule.   
NEEDED FOR ANXIETY      oxyBUTYnin (DITROPAN) 5 MG tablet Take 1 tablet by mouth 2 times daily for 10 days 20 tablet 0     No current facility-administered medications for this visit.       Allergies   Allergen Reactions    Indapamide Other (See Comments)    Lisinopril Other (See Comments)    Niacin Rash    Potassium Other (See Comments)     Heartburn, indigestion    Statins Myalgia     Muscle pain, and pt says caused \"UTI\"       SOCIAL HISTORY:     Social History     Tobacco Use    Smoking status: Former     Current packs/day: 0.00     Average packs/day: 0.5 packs/day for 40.0 years (20.0 ttl pk-yrs)     Types: Cigarettes     Start date:      Quit date:      Years since quittin.1    Smokeless tobacco: Never   Substance Use Topics    Alcohol use: Not Currently    Drug use: Never       FAMILY HISTORY:     No family history on file.    REVIEW OF SYMPTOMS:     Review of Symptoms:  Negative except as above, all other systems reviewed and are negative for a Comprehensive ROS (10+)    PHYSICAL EXAM:     Physical Exam:  BP (!) 152/58 (Site: Right Upper Arm, Position: Sitting, Cuff Size: Medium Adult)   Pulse 98   Ht 1.778 m (5' 10\")   Wt 86.3 kg (190 lb 3.2 oz)   SpO2 95%   BMI 27.29 kg/m²     General: alert, cooperative, no distress, appears stated age  Neck: supple, symmetrical, trachea midline, no adenopathy, thyroid: not enlarged, symmetric, no tenderness/mass/nodules, no carotid bruit, and no JVD  Lungs: clear to auscultation bilaterally  Heart: regular rate and rhythm, S1, S2 normal, no murmur, no click, rub or gallop  Extremities: extremities normal, atraumatic, no cyanosis or edema  Skin: No significant rashes  MSKTL: Overall good ROM ext  Neuro: Grossly intact  Psych: Appropriate affect    LABS / OTHER STUDIES:     Lab Results   Component Value Date/Time     2025 06:16 AM    K 3.1 2025 06:16 AM     2025 06:16 AM    CO2 24 2025 06:16 AM    BUN 15 2025

## 2025-02-13 ENCOUNTER — TELEPHONE (OUTPATIENT)
Age: 75
End: 2025-02-13

## 2025-02-13 NOTE — TELEPHONE ENCOUNTER
CALLED PATIENT FOR HIS APPT WITH DR. CASTAÑEDA FOR MONDAY 2/17-2:15. HE PROCEEDS TO INFORM ME IS GONE TILL THE SUMMER AND CAN NOT MAKE THAT APPT. I EXPRESSED TO HIM IT WAS SENT BY DR. CHAMORRO'S REF TO BE SEEN URGENTLY. HE SAYS, FIRST TIME SEEING HER AND HE TOLD HER HE WOULD BE GONE. NOTE SENT BACK TO HER OFFICE IN [place]- AND THAT I WOULD HOLE APPT IF IT IS URGENT AND THEY WANT TO SPEAK TO HIM AGAIN.

## 2025-06-27 ENCOUNTER — TRANSCRIBE ORDERS (OUTPATIENT)
Facility: HOSPITAL | Age: 75
End: 2025-06-27

## 2025-06-27 DIAGNOSIS — I71.20 THORACIC AORTIC ANEURYSM WITHOUT RUPTURE, UNSPECIFIED PART: Primary | ICD-10-CM

## (undated) DEVICE — GLOVE ORANGE PI 7 1/2   MSG9075

## (undated) DEVICE — SOLUTION IRRIG 1000ML STRL H2O USP PLAS POUR BTL

## (undated) DEVICE — DEVICE TRNSF SPIK STL 2008S] MICROTEK MEDICAL INC]

## (undated) DEVICE — ROCKER SWITCH PENCIL BLADE ELECTRODE, HOLSTER: Brand: EDGE

## (undated) DEVICE — CYSTO-SFMC: Brand: MEDLINE INDUSTRIES, INC.

## (undated) DEVICE — STERILE POLYISOPRENE POWDER-FREE SURGICAL GLOVES: Brand: PROTEXIS

## (undated) DEVICE — SUTURE PDS II SZ 0 L27IN ABSRB VLT L36MM CT-1 1/2 CIR Z340H

## (undated) DEVICE — CANISTER, RIGID, 3000CC: Brand: MEDLINE INDUSTRIES, INC.

## (undated) DEVICE — DBD-PACK,LAPAROTOMY,2 REINFORCED GOWNS: Brand: MEDLINE

## (undated) DEVICE — TOWEL SURG W17XL27IN STD BLU COT NONFENESTRATED PREWASHED

## (undated) DEVICE — SOL IRRIGATION INJ NACL 0.9% 500ML BTL

## (undated) DEVICE — 4-PORT MANIFOLD: Brand: NEPTUNE 2

## (undated) DEVICE — OPEN-END URETERAL CATHETER: Brand: COOK

## (undated) DEVICE — APPLICATOR BNDG 1MM ADH PREMIERPRO EXOFIN

## (undated) DEVICE — DRAPE,REIN 53X77,STERILE: Brand: MEDLINE

## (undated) DEVICE — INFECTION CONTROL KIT SYS

## (undated) DEVICE — (D)PREP SKN CHLRAPRP APPL 26ML -- CONVERT TO ITEM 371833

## (undated) DEVICE — SOLUTION IRRIG 3000ML 0.9% SOD CHL USP UROMATIC PLAS CONT

## (undated) DEVICE — COVER LT HNDL PLAS RIG 1 PER PK

## (undated) DEVICE — SUTURE MCRYL SZ 3-0 L27IN ABSRB UD L26MM SH 1/2 CIR Y416H

## (undated) DEVICE — URETERAL STENT
Type: IMPLANTABLE DEVICE | Site: URETER | Status: NON-FUNCTIONAL
Brand: CONTOUR™

## (undated) DEVICE — GUIDEWIRE ENDOSCP L150CM DIA0.035IN TIP 3CM PTFE NIT

## (undated) DEVICE — SUTURE MCRYL SZ 4-0 L27IN ABSRB UD L19MM PS-2 1/2 CIR PRIM Y426H

## (undated) DEVICE — STRAP,POSITIONING,KNEE/BODY,FOAM,4X60": Brand: MEDLINE

## (undated) DEVICE — SKN PREP SPNG STKS PVP PNT STR: Brand: MEDLINE INDUSTRIES, INC.

## (undated) DEVICE — REM POLYHESIVE ADULT PATIENT RETURN ELECTRODE: Brand: VALLEYLAB

## (undated) DEVICE — SUTURE VCRL SZ 2-0 L36IN ABSRB UD L40MM CT 1/2 CIR J957H

## (undated) DEVICE — NEEDLE HYPO 22GA L1.5IN BLK S STL HUB POLYPR SHLD REG BVL

## (undated) DEVICE — SURGICAL PROCEDURE PACK BASIN MAJ SET CUST NO CAUT

## (undated) DEVICE — GOWN,SIRUS,FABRNF,XL,20/CS: Brand: MEDLINE